# Patient Record
Sex: FEMALE | Race: WHITE | Employment: OTHER | URBAN - METROPOLITAN AREA
[De-identification: names, ages, dates, MRNs, and addresses within clinical notes are randomized per-mention and may not be internally consistent; named-entity substitution may affect disease eponyms.]

---

## 2017-01-11 PROCEDURE — 84080 ASSAY ALKALINE PHOSPHATASES: CPT | Performed by: INTERNAL MEDICINE

## 2017-01-11 PROCEDURE — 84075 ASSAY ALKALINE PHOSPHATASE: CPT | Performed by: INTERNAL MEDICINE

## 2017-02-19 PROBLEM — M25.551 RIGHT HIP PAIN: Status: ACTIVE | Noted: 2017-02-19

## 2017-02-19 PROBLEM — M06.9: Status: ACTIVE | Noted: 2017-02-19

## 2017-06-09 RX ORDER — FEXOFENADINE HCL 180 MG/1
180 TABLET ORAL DAILY
COMMUNITY

## 2017-06-09 RX ORDER — GARLIC EXTRACT 500 MG
1 CAPSULE ORAL DAILY
COMMUNITY

## 2017-06-09 RX ORDER — MULTIVITAMIN
1 TABLET ORAL DAILY
Status: ON HOLD | COMMUNITY
End: 2022-01-12

## 2017-06-19 PROBLEM — M25.551 RIGHT HIP PAIN: Status: RESOLVED | Noted: 2017-02-19 | Resolved: 2017-06-19

## 2017-06-19 PROCEDURE — 87081 CULTURE SCREEN ONLY: CPT | Performed by: FAMILY MEDICINE

## 2017-06-22 ENCOUNTER — APPOINTMENT (OUTPATIENT)
Dept: LAB | Facility: HOSPITAL | Age: 67
End: 2017-06-22
Payer: COMMERCIAL

## 2017-06-22 PROCEDURE — 86850 RBC ANTIBODY SCREEN: CPT

## 2017-06-22 PROCEDURE — 86901 BLOOD TYPING SEROLOGIC RH(D): CPT

## 2017-06-22 PROCEDURE — 86900 BLOOD TYPING SEROLOGIC ABO: CPT

## 2017-06-22 PROCEDURE — 36415 COLL VENOUS BLD VENIPUNCTURE: CPT

## 2017-06-22 NOTE — H&P
[facility]    PATIENT'S NAME: Gregreece Deep   ATTENDING PHYSICIAN: Alex Anaya M.D.    PATIENT ACCOUNT#:   [de-identified]    LOCATION:  OR   [facility]  MEDICAL RECORD #:   [id number]       YOB: 1950  ADMISSION DATE:       06/30/2017    HISTORY pantoprazole sodium, eszopiclone, hydrocodone, Xanax, prednisone, tramadol, ramipril, Breo inhaler, ProAir inhaler, oxybutynin, Flonase, Unisom, Senokot. ALLERGIES:  Dust, Humira. FAMILY HISTORY:  Two grandparents are alive, one with cancer.   Matern

## 2017-06-26 PROBLEM — M16.11 PRIMARY OSTEOARTHRITIS OF RIGHT HIP: Status: ACTIVE | Noted: 2017-06-26

## 2017-06-30 ENCOUNTER — ANESTHESIA EVENT (OUTPATIENT)
Dept: SURGERY | Facility: HOSPITAL | Age: 67
End: 2017-06-30

## 2017-06-30 ENCOUNTER — APPOINTMENT (OUTPATIENT)
Dept: GENERAL RADIOLOGY | Facility: HOSPITAL | Age: 67
DRG: 470 | End: 2017-06-30
Attending: ORTHOPAEDIC SURGERY
Payer: COMMERCIAL

## 2017-06-30 ENCOUNTER — HOSPITAL ENCOUNTER (INPATIENT)
Facility: HOSPITAL | Age: 67
LOS: 1 days | Discharge: HOME HEALTH | DRG: 470 | End: 2017-07-01
Attending: ORTHOPAEDIC SURGERY | Admitting: ORTHOPAEDIC SURGERY
Payer: COMMERCIAL

## 2017-06-30 ENCOUNTER — ANESTHESIA (OUTPATIENT)
Dept: SURGERY | Facility: HOSPITAL | Age: 67
End: 2017-06-30

## 2017-06-30 ENCOUNTER — SURGERY (OUTPATIENT)
Age: 67
End: 2017-06-30

## 2017-06-30 DIAGNOSIS — M25.551 RIGHT HIP PAIN: ICD-10-CM

## 2017-06-30 DIAGNOSIS — M06.9 RHEUMATOID ARTHRITIS INVOLVING BOTH HIPS, UNSPECIFIED RHEUMATOID FACTOR PRESENCE: Primary | ICD-10-CM

## 2017-06-30 PROCEDURE — 3E0T3CZ INTRODUCTION OF REGIONAL ANESTHETIC INTO PERIPHERAL NERVES AND PLEXI, PERCUTANEOUS APPROACH: ICD-10-PCS | Performed by: STUDENT IN AN ORGANIZED HEALTH CARE EDUCATION/TRAINING PROGRAM

## 2017-06-30 PROCEDURE — 73501 X-RAY EXAM HIP UNI 1 VIEW: CPT | Performed by: ORTHOPAEDIC SURGERY

## 2017-06-30 PROCEDURE — 97116 GAIT TRAINING THERAPY: CPT

## 2017-06-30 PROCEDURE — 88304 TISSUE EXAM BY PATHOLOGIST: CPT | Performed by: ORTHOPAEDIC SURGERY

## 2017-06-30 PROCEDURE — 97161 PT EVAL LOW COMPLEX 20 MIN: CPT

## 2017-06-30 PROCEDURE — 76942 ECHO GUIDE FOR BIOPSY: CPT | Performed by: ORTHOPAEDIC SURGERY

## 2017-06-30 PROCEDURE — 0SRB04A REPLACEMENT OF LEFT HIP JOINT WITH CERAMIC ON POLYETHYLENE SYNTHETIC SUBSTITUTE, UNCEMENTED, OPEN APPROACH: ICD-10-PCS | Performed by: ORTHOPAEDIC SURGERY

## 2017-06-30 PROCEDURE — 88311 DECALCIFY TISSUE: CPT | Performed by: ORTHOPAEDIC SURGERY

## 2017-06-30 DEVICE — TM MOD CUP 52MM CLUSTER: Type: IMPLANTABLE DEVICE | Site: HIP | Status: FUNCTIONAL

## 2017-06-30 DEVICE — MOD CUP NEU LNR LG 50/52/54X36: Type: IMPLANTABLE DEVICE | Site: HIP | Status: FUNCTIONAL

## 2017-06-30 DEVICE — BIOLOX® DELTA, CERAMIC FEMORAL HEAD, M, Ø 36/0, TAPER 12/14
Type: IMPLANTABLE DEVICE | Site: HIP | Status: FUNCTIONAL
Brand: BIOLOX® DELTA

## 2017-06-30 DEVICE — BONE SCREW 6.5X30 SELF-TAP: Type: IMPLANTABLE DEVICE | Site: HIP | Status: FUNCTIONAL

## 2017-06-30 DEVICE — BONE SCREW 6.5X40 SELF-TAP: Type: IMPLANTABLE DEVICE | Site: HIP | Status: FUNCTIONAL

## 2017-06-30 RX ORDER — FLUTICASONE PROPIONATE 50 MCG
2 SPRAY, SUSPENSION (ML) NASAL
COMMUNITY

## 2017-06-30 RX ORDER — METOCLOPRAMIDE HYDROCHLORIDE 5 MG/ML
10 INJECTION INTRAMUSCULAR; INTRAVENOUS EVERY 6 HOURS PRN
Status: DISCONTINUED | OUTPATIENT
Start: 2017-06-30 | End: 2017-07-01

## 2017-06-30 RX ORDER — NALOXONE HYDROCHLORIDE 0.4 MG/ML
80 INJECTION, SOLUTION INTRAMUSCULAR; INTRAVENOUS; SUBCUTANEOUS AS NEEDED
Status: DISCONTINUED | OUTPATIENT
Start: 2017-06-30 | End: 2017-06-30 | Stop reason: HOSPADM

## 2017-06-30 RX ORDER — HYDROMORPHONE HYDROCHLORIDE 1 MG/ML
0.2 INJECTION, SOLUTION INTRAMUSCULAR; INTRAVENOUS; SUBCUTANEOUS EVERY 2 HOUR PRN
Status: DISCONTINUED | OUTPATIENT
Start: 2017-06-30 | End: 2017-07-01

## 2017-06-30 RX ORDER — HYDROMORPHONE HYDROCHLORIDE 1 MG/ML
0.4 INJECTION, SOLUTION INTRAMUSCULAR; INTRAVENOUS; SUBCUTANEOUS EVERY 2 HOUR PRN
Status: DISCONTINUED | OUTPATIENT
Start: 2017-06-30 | End: 2017-07-01

## 2017-06-30 RX ORDER — ESZOPICLONE 3 MG/1
3 TABLET, FILM COATED ORAL NIGHTLY
COMMUNITY
End: 2017-07-09

## 2017-06-30 RX ORDER — DIPHENHYDRAMINE HYDROCHLORIDE 50 MG/ML
12.5 INJECTION INTRAMUSCULAR; INTRAVENOUS EVERY 4 HOURS PRN
Status: DISCONTINUED | OUTPATIENT
Start: 2017-06-30 | End: 2017-07-01

## 2017-06-30 RX ORDER — TRAMADOL HYDROCHLORIDE 50 MG/1
50 TABLET ORAL EVERY 6 HOURS
Status: DISCONTINUED | OUTPATIENT
Start: 2017-06-30 | End: 2017-07-01

## 2017-06-30 RX ORDER — ACETAMINOPHEN 325 MG/1
650 TABLET ORAL ONCE
Status: COMPLETED | OUTPATIENT
Start: 2017-06-30 | End: 2017-06-30

## 2017-06-30 RX ORDER — DIPHENHYDRAMINE HYDROCHLORIDE 50 MG/ML
12.5 INJECTION INTRAMUSCULAR; INTRAVENOUS AS NEEDED
Status: DISCONTINUED | OUTPATIENT
Start: 2017-06-30 | End: 2017-06-30 | Stop reason: HOSPADM

## 2017-06-30 RX ORDER — ONDANSETRON 2 MG/ML
4 INJECTION INTRAMUSCULAR; INTRAVENOUS EVERY 4 HOURS PRN
Status: DISCONTINUED | OUTPATIENT
Start: 2017-06-30 | End: 2017-07-01

## 2017-06-30 RX ORDER — POLYETHYLENE GLYCOL 3350 17 G/17G
17 POWDER, FOR SOLUTION ORAL DAILY PRN
Status: DISCONTINUED | OUTPATIENT
Start: 2017-06-30 | End: 2017-07-01

## 2017-06-30 RX ORDER — ZOLPIDEM TARTRATE 5 MG/1
5 TABLET ORAL NIGHTLY PRN
Status: DISCONTINUED | OUTPATIENT
Start: 2017-06-30 | End: 2017-07-01

## 2017-06-30 RX ORDER — DIPHENHYDRAMINE HCL 25 MG
25 CAPSULE ORAL EVERY 4 HOURS PRN
Status: DISCONTINUED | OUTPATIENT
Start: 2017-06-30 | End: 2017-07-01

## 2017-06-30 RX ORDER — RAMIPRIL 10 MG/1
20 CAPSULE ORAL EVERY EVENING
COMMUNITY
End: 2018-02-13

## 2017-06-30 RX ORDER — MEPERIDINE HYDROCHLORIDE 25 MG/ML
12.5 INJECTION INTRAMUSCULAR; INTRAVENOUS; SUBCUTANEOUS AS NEEDED
Status: DISCONTINUED | OUTPATIENT
Start: 2017-06-30 | End: 2017-06-30 | Stop reason: HOSPADM

## 2017-06-30 RX ORDER — HYDROMORPHONE HYDROCHLORIDE 1 MG/ML
0.8 INJECTION, SOLUTION INTRAMUSCULAR; INTRAVENOUS; SUBCUTANEOUS EVERY 2 HOUR PRN
Status: DISCONTINUED | OUTPATIENT
Start: 2017-06-30 | End: 2017-07-01

## 2017-06-30 RX ORDER — PANTOPRAZOLE SODIUM 40 MG/1
40 TABLET, DELAYED RELEASE ORAL
COMMUNITY
End: 2018-05-23

## 2017-06-30 RX ORDER — ALPRAZOLAM 0.25 MG/1
0.25 TABLET ORAL EVERY 6 HOURS PRN
COMMUNITY
End: 2018-05-23

## 2017-06-30 RX ORDER — SENNOSIDES 8.6 MG
17.2 TABLET ORAL NIGHTLY
Status: DISCONTINUED | OUTPATIENT
Start: 2017-06-30 | End: 2017-07-01

## 2017-06-30 RX ORDER — ALPRAZOLAM 0.25 MG/1
0.25 TABLET ORAL EVERY 6 HOURS PRN
Status: DISCONTINUED | OUTPATIENT
Start: 2017-06-30 | End: 2017-07-01

## 2017-06-30 RX ORDER — ACETAMINOPHEN 325 MG/1
650 TABLET ORAL 4 TIMES DAILY
Status: DISCONTINUED | OUTPATIENT
Start: 2017-06-30 | End: 2017-07-01

## 2017-06-30 RX ORDER — HYDROCODONE BITARTRATE AND ACETAMINOPHEN 10; 325 MG/1; MG/1
1-2 TABLET ORAL EVERY 4 HOURS PRN
Qty: 80 TABLET | Refills: 0 | Status: SHIPPED | OUTPATIENT
Start: 2017-06-30 | End: 2017-11-08 | Stop reason: ALTCHOICE

## 2017-06-30 RX ORDER — ETANERCEPT 50 MG/ML
SOLUTION SUBCUTANEOUS
Refills: 1 | COMMUNITY
Start: 2017-06-19 | End: 2017-08-08

## 2017-06-30 RX ORDER — OXYCODONE HYDROCHLORIDE 5 MG/1
5 TABLET ORAL EVERY 4 HOURS PRN
Status: DISCONTINUED | OUTPATIENT
Start: 2017-06-30 | End: 2017-07-01

## 2017-06-30 RX ORDER — TRAMADOL HYDROCHLORIDE 50 MG/1
50 TABLET ORAL
Status: ON HOLD | COMMUNITY
End: 2018-09-14

## 2017-06-30 RX ORDER — CETIRIZINE HYDROCHLORIDE 10 MG/1
10 TABLET ORAL DAILY PRN
Status: DISCONTINUED | OUTPATIENT
Start: 2017-06-30 | End: 2017-07-01

## 2017-06-30 RX ORDER — ONDANSETRON 2 MG/ML
4 INJECTION INTRAMUSCULAR; INTRAVENOUS AS NEEDED
Status: DISCONTINUED | OUTPATIENT
Start: 2017-06-30 | End: 2017-06-30 | Stop reason: HOSPADM

## 2017-06-30 RX ORDER — HYDROMORPHONE HYDROCHLORIDE 1 MG/ML
0.4 INJECTION, SOLUTION INTRAMUSCULAR; INTRAVENOUS; SUBCUTANEOUS EVERY 5 MIN PRN
Status: DISCONTINUED | OUTPATIENT
Start: 2017-06-30 | End: 2017-06-30 | Stop reason: HOSPADM

## 2017-06-30 RX ORDER — ACETAMINOPHEN, ASPIRIN AND CAFFEINE 250; 250; 65 MG/1; MG/1; MG/1
1 TABLET, FILM COATED ORAL EVERY 6 HOURS PRN
Status: ON HOLD | COMMUNITY
End: 2017-07-01

## 2017-06-30 RX ORDER — SODIUM CHLORIDE, SODIUM LACTATE, POTASSIUM CHLORIDE, CALCIUM CHLORIDE 600; 310; 30; 20 MG/100ML; MG/100ML; MG/100ML; MG/100ML
INJECTION, SOLUTION INTRAVENOUS CONTINUOUS
Status: DISCONTINUED | OUTPATIENT
Start: 2017-06-30 | End: 2017-07-01

## 2017-06-30 RX ORDER — CYCLOBENZAPRINE HCL 5 MG
5 TABLET ORAL 3 TIMES DAILY PRN
Status: DISCONTINUED | OUTPATIENT
Start: 2017-06-30 | End: 2017-07-01

## 2017-06-30 RX ORDER — ALBUTEROL SULFATE 90 UG/1
2 AEROSOL, METERED RESPIRATORY (INHALATION) EVERY 4 HOURS PRN
Status: DISCONTINUED | OUTPATIENT
Start: 2017-06-30 | End: 2017-07-01

## 2017-06-30 RX ORDER — BISACODYL 10 MG
10 SUPPOSITORY, RECTAL RECTAL
Status: DISCONTINUED | OUTPATIENT
Start: 2017-06-30 | End: 2017-07-01

## 2017-06-30 RX ORDER — FLUTICASONE PROPIONATE 50 MCG
2 SPRAY, SUSPENSION (ML) NASAL
Status: DISCONTINUED | OUTPATIENT
Start: 2017-06-30 | End: 2017-07-01

## 2017-06-30 RX ORDER — ACETAMINOPHEN 325 MG/1
TABLET ORAL
Status: COMPLETED
Start: 2017-06-30 | End: 2017-06-30

## 2017-06-30 RX ORDER — CEFAZOLIN SODIUM 1 G/3ML
INJECTION, POWDER, FOR SOLUTION INTRAMUSCULAR; INTRAVENOUS
Status: DISCONTINUED | OUTPATIENT
Start: 2017-06-30 | End: 2017-06-30 | Stop reason: HOSPADM

## 2017-06-30 RX ORDER — SODIUM PHOSPHATE, DIBASIC AND SODIUM PHOSPHATE, MONOBASIC 7; 19 G/133ML; G/133ML
1 ENEMA RECTAL ONCE AS NEEDED
Status: DISCONTINUED | OUTPATIENT
Start: 2017-06-30 | End: 2017-07-01

## 2017-06-30 RX ORDER — OXYCODONE HCL 10 MG/1
10 TABLET, FILM COATED, EXTENDED RELEASE ORAL
Status: DISCONTINUED | OUTPATIENT
Start: 2017-06-30 | End: 2017-07-01

## 2017-06-30 RX ORDER — OXYCODONE HCL 10 MG/1
10 TABLET, FILM COATED, EXTENDED RELEASE ORAL
Status: COMPLETED | OUTPATIENT
Start: 2017-06-30 | End: 2017-06-30

## 2017-06-30 RX ORDER — PANTOPRAZOLE SODIUM 40 MG/1
40 TABLET, DELAYED RELEASE ORAL
Status: DISCONTINUED | OUTPATIENT
Start: 2017-06-30 | End: 2017-07-01

## 2017-06-30 RX ORDER — OXYCODONE HYDROCHLORIDE 15 MG/1
15 TABLET ORAL EVERY 4 HOURS PRN
Status: DISCONTINUED | OUTPATIENT
Start: 2017-06-30 | End: 2017-07-01

## 2017-06-30 RX ORDER — DOCUSATE SODIUM 100 MG/1
100 CAPSULE, LIQUID FILLED ORAL 2 TIMES DAILY
Status: DISCONTINUED | OUTPATIENT
Start: 2017-06-30 | End: 2017-07-01

## 2017-06-30 RX ORDER — DIPHENHYDRAMINE HYDROCHLORIDE 50 MG/ML
25 INJECTION INTRAMUSCULAR; INTRAVENOUS ONCE AS NEEDED
Status: ACTIVE | OUTPATIENT
Start: 2017-06-30 | End: 2017-06-30

## 2017-06-30 RX ORDER — OXYBUTYNIN CHLORIDE 10 MG/1
10 TABLET, EXTENDED RELEASE ORAL DAILY
COMMUNITY
End: 2017-11-08

## 2017-06-30 RX ORDER — OXYCODONE HYDROCHLORIDE 10 MG/1
10 TABLET ORAL EVERY 4 HOURS PRN
Status: DISCONTINUED | OUTPATIENT
Start: 2017-06-30 | End: 2017-07-01

## 2017-06-30 RX ORDER — MIDAZOLAM HYDROCHLORIDE 1 MG/ML
1 INJECTION INTRAMUSCULAR; INTRAVENOUS EVERY 5 MIN PRN
Status: DISCONTINUED | OUTPATIENT
Start: 2017-06-30 | End: 2017-06-30 | Stop reason: HOSPADM

## 2017-06-30 NOTE — HOME CARE LIAISON
Received referral for [facility] on d/c for SN/PT. Met with patient who is agreeable to [facility] on d/c. Agency brochure given to patient. Referral sent to [facility] via [facility]    Thank you for this referral,   Cuong Muller

## 2017-06-30 NOTE — HOME CARE LIAISON
DIAGNOSES AND PERTINENT MEDICAL HISTORY:  RIGHT THR    FACILITY NAME AND DC DATE:  [facility]   PENDING    BEDSIDE VISIT WITH: TNL MET WITHPT AT BEDSIDE    SERVICES ORDERED:  SN/PT    VERIFIED PATIENT ADDRESS, PHONE NUMBER AND CAREGIVER   Y    [phone]

## 2017-06-30 NOTE — BRIEF OP NOTE
Pre-Operative Diagnosis: OSTEOARTHRITIS RIGHT HIP     Post-Operative Diagnosis: OSTEOARTHRITIS RIGHT HIP     Procedure Performed:   Procedure(s):  RIGHT TOTAL HIP ARTHROPLASTY    Surgeon(s) and Role:     Shorty Porter MD - Primary    Assistant(s):

## 2017-06-30 NOTE — ANESTHESIA POSTPROCEDURE EVALUATION
[address] [facility] Patient Status:  Surgery Admit   Age/Gender 79year old female MRN [id number]   [facility] SURGERY Attending Mehran Arriaza MD   Hosp Day # 0 PCP Alhaji Paez DO       Anesthesia Post-op Note    Procedure(s):

## 2017-06-30 NOTE — PHYSICAL THERAPY NOTE
PHYSICAL THERAPY HIP EVALUATION - INPATIENT     Room Number: 353/353-A  Evaluation Date: 6/30/2017  Type of Evaluation: Initial  Physician Order: PT Eval and Treat    Presenting Problem: s/p R DARRICK  Reason for Therapy: Mobility Dysfunction and Discharge [facility]NAG[facility]  4/1/2015: INJECTION, ANESTHETIC/STEROID, TRANSFORAMINAL * N/A      Comment: Procedure: TRANSFORAMINAL EPIDURAL - LUMBAR;                 Surgeon: Elizabeth Sharif MD;  Location: [address] [place] [address] MANAGEMENT  4/16/2015: Mariaa Huerta strength are within functional limits     Lower extremity ROM is within functional limits     Lower extremity strength is within functional limits     BALANCE                ADDITIONAL TESTS                                 ACTIVITY TOLERANCE  O2 Saturation in chair;Needs met;Call light within reach;RN aware of session/findings; All patient questions and concerns addressed;SCDs in place    ASSESSMENT          Patient is a 79year old female admitted on 6/30/2017 for R DARRICK.   In this PT evaluation, the patient p

## 2017-06-30 NOTE — CONSULTS
General Medicine Consult      Reason for consult: post-op medical management     Consulted by: Dr. Sangeeta Galarza    PCP: Jazmín Shelton DO      History of Present Illness: Patient is a 79year old female with PMH sig for RA, HENRI, HTN presented for elective R DARRICK. Jarrell Roque MD;  Location: [address] [place] [address] MANAGEMENT  4/1/2015: INJECTION, ANESTHETIC/STEROID, TRANSFORAMINAL * N/A      Comment: Procedure: TRANSFORAMINAL EPIDURAL - LUMBAR;                 Surgeon: Patel Mckeon MD;  Location: [facility] Oral Tab Take 50 mg by mouth. Every 6-8 hours as needed.   Disp:  Rfl:    ENBREL SURECLICK 50 MG/ML Subcutaneous Solution Auto-injector  Disp:  Rfl: 1   HYDROcodone-acetaminophen (NORCO)  MG Oral Tab Take 1-2 tablets by mouth every 4 (four) hours as n diphenhydrAMINE **OR** DiphenhydrAMINE HCl, Zolpidem Tartrate, Cyclobenzaprine HCl, oxyCODONE HCl **OR** oxyCODONE HCl **OR** oxyCODONE HCl, HYDROmorphone HCl PF **OR** HYDROmorphone HCl PF **OR** HYDROmorphone HCl PF, Albuterol Sulfate HFA, ALPRAZolam, ce place/c/d/i      Diagnostics:   CBC/Chem  Component      Latest Ref Rng & Units 6/19/2017   WBC      4.00 - 13.00 10:3/uL 8.43   RBC      3.80 - 5.10 10:6/uL 4.27   Hemoglobin      12.0 - 16.0 g/dL 12.5   Hematocrit      34.0 - 50.0 % 40.4   MCV      81.0 (lmd=99312)    Result Date: 6/27/2017  PELVIS AND FEMUR STANDING AP OF THE PELVIS: Patient does have osteoarthritis in both hips, right greater than left, with joint space narrowing and large osteophytes are present involving the lateral aspect of the righ # HTN  - SBP 140s  - cont OP BP meds    # Anxiety  - cont OP regimen      Check Hgb in AM to monitor for acute blood loss anemia. Outpatient records or previous hospital records reviewed. Appreciate this consultation.  [facility] hospitalist to continue

## 2017-06-30 NOTE — ANESTHESIA PREPROCEDURE EVALUATION
PRE-OP EVALUATION    Patient Name: Leonila More    Pre-op Diagnosis: OSTEOARTHRITIS RIGHT HIP    Procedure(s):  RIGHT TOTAL HIP ARTHROPLASTY    Surgeon(s) and Role:     Karlene Jerez MD - Primary    Pre-op vitals reviewed.   Temp: 97.4 °F (36.3 °C)  Pul Disp: 1 Inhaler Rfl: 11   OXYBUTYNIN CHLORIDE ER 10 MG Oral Tablet 24 Hr TAKE 1 TABLET BY MOUTH EVERY DAY Disp: 90 tablet Rfl: 3   Fluticasone Propionate (FLONASE) 50 MCG/ACT Nasal Suspension 2 sprays in each nostril daily as needed Disp: 1 Bottle Rfl: 0 [facility]  4/1/2015: INJECTION, ANESTHETIC/STEROID, TRANSFORAMINAL * N/A      Comment: Procedure: TRANSFORAMINAL EPIDURAL - LUMBAR;                 Surgeon: Harmony Armstrong MD;  Location: [address] [place] [address] opening: >3 FB  TM distance: > 6 cm  Neck ROM: full Cardiovascular    Cardiovascular exam normal.  Rhythm: regular  Rate: normal     Dental    No notable dental history.          Pulmonary    Pulmonary exam normal.  Breath sounds clear to auscultation bilat

## 2017-06-30 NOTE — CM/SW NOTE
06/30/17 1500   CM/SW Referral Data   Referral Source Physician   Reason for Referral Discharge planning   Informant Patient;Spouse   Pertinent Medical Hx   Primary Care Physician Name J.  [facility]   Patient Info   Patient's Mental Status Alert;Oriented

## 2017-06-30 NOTE — INTERVAL H&P NOTE
Pre-op Diagnosis: OSTEOARTHRITIS RIGHT HIP    The above referenced H&P was reviewed by Tasneem Bassett MD on 6/30/2017, the patient was examined and no significant changes have occurred in the patient's condition since the H&P was performed.   I discussed wi

## 2017-07-01 VITALS
SYSTOLIC BLOOD PRESSURE: 113 MMHG | DIASTOLIC BLOOD PRESSURE: 65 MMHG | HEART RATE: 59 BPM | TEMPERATURE: 98 F | HEIGHT: 69 IN | WEIGHT: 200.75 LBS | OXYGEN SATURATION: 97 % | BODY MASS INDEX: 29.73 KG/M2 | RESPIRATION RATE: 22 BRPM

## 2017-07-01 LAB
ERYTHROCYTE [DISTWIDTH] IN BLOOD BY AUTOMATED COUNT: 17.9 % (ref 11.5–16)
HCT VFR BLD AUTO: 31 % (ref 34–50)
HGB BLD-MCNC: 10 G/DL (ref 12–16)
MCH RBC QN AUTO: 29.5 PG (ref 27–33.2)
MCHC RBC AUTO-ENTMCNC: 32.3 G/DL (ref 31–37)
MCV RBC AUTO: 91.4 FL (ref 81–100)
PLATELET # BLD AUTO: 253 10(3)UL (ref 150–450)
RBC # BLD AUTO: 3.39 X10(6)UL (ref 3.8–5.1)
RED CELL DISTRIBUTION WIDTH-SD: 60.1 FL (ref 35.1–46.3)
WBC # BLD AUTO: 10.4 X10(3) UL (ref 4–13)

## 2017-07-01 PROCEDURE — 97116 GAIT TRAINING THERAPY: CPT

## 2017-07-01 PROCEDURE — 97166 OT EVAL MOD COMPLEX 45 MIN: CPT

## 2017-07-01 PROCEDURE — 97150 GROUP THERAPEUTIC PROCEDURES: CPT

## 2017-07-01 PROCEDURE — 85027 COMPLETE CBC AUTOMATED: CPT | Performed by: ORTHOPAEDIC SURGERY

## 2017-07-01 PROCEDURE — 97535 SELF CARE MNGMENT TRAINING: CPT

## 2017-07-01 RX ORDER — RAMIPRIL 5 MG/1
20 CAPSULE ORAL EVERY EVENING
Status: DISCONTINUED | OUTPATIENT
Start: 2017-07-01 | End: 2017-07-01

## 2017-07-01 RX ORDER — HYDROCODONE BITARTRATE AND ACETAMINOPHEN 10; 325 MG/1; MG/1
2 TABLET ORAL EVERY 4 HOURS PRN
Status: DISCONTINUED | OUTPATIENT
Start: 2017-07-01 | End: 2017-07-01

## 2017-07-01 RX ORDER — HYDROCODONE BITARTRATE AND ACETAMINOPHEN 10; 325 MG/1; MG/1
1 TABLET ORAL EVERY 4 HOURS PRN
Status: DISCONTINUED | OUTPATIENT
Start: 2017-07-01 | End: 2017-07-01

## 2017-07-01 RX ORDER — MELATONIN
325
Qty: 30 TABLET | Refills: 0 | Status: SHIPPED | OUTPATIENT
Start: 2017-07-01 | End: 2018-08-16

## 2017-07-01 NOTE — PHYSICAL THERAPY NOTE
PHYSICAL THERAPY HIP TREATMENT NOTE - INPATIENT      Room Number: 353/353-A     Session: 2  Number of Visits to Meet Established Goals: 4    Presenting Problem: s/p R DARRICK    Problem List  Active Problems:    * No active hospital problems.  *      Past Medic Surgeon: Brenda Bradshaw MD;  Location: [address] [place] [address] MANAGEMENT  4/16/2015: INJECTION, ANESTHETIC/STEROID, TRANSFORAMINAL * Left      Comment: Procedure: TRANSFORAMINAL EPIDURAL - LUMBAR;                 Surgeon: Jess Batres, Little   -   Need to walk in hospital room?: A Little   -   Climbing 3-5 steps with a railing?: A Little       AM-PAC Score:  Raw Score: 18   PT Approx Degree of Impairment Score: 46.58%   Standardized Score (AM-PAC Scale): 43.63   CMS Modifier (G-Code): C education;Patient education;Gait training;Range of motion;Strengthening;Stair training;Transfer training  Rehab Potential : Good  Frequency (Obs): BID    CURRENT GOALS  Goal #1  Patient is able to demonstrate supine - sit EOB @ level: supervision-NT   Goal

## 2017-07-01 NOTE — PROGRESS NOTES
She feels great. She walked multiple times yesterday. Working with OT currently. She wants to go home today.     Patient Vitals for the past 24 hrs:   BP Temp Temp src Pulse Resp SpO2   07/01/17 0738 129/75 97.6 °F (36.4 °C) Oral 59 18 98 %   07/01/17 040

## 2017-07-01 NOTE — PROGRESS NOTES
[facility] Hospitalist Progress Note                                                                   [address]  1/6/1950    CC: FU post op    Interval History:  - Feels well today, hoping to g MCV  91.4   MCH  29.5   MCHC  32.3   RDW  17.9*   WBC  10.4   PLT  253.0     No results for input(s): GLU, BUN, CREATSERUM, GFRAA, GFRNAA, CA, NA, K, CL, CO2 in the last 72 hours.         ROS: no change to ROS from my documentation yesterday, except as ot

## 2017-07-01 NOTE — CM/SW NOTE
Informed by [facility] liaison late yesterday that she met with pt re: no copay for [facility] visits. [facility] Amilcar Kennyleeanna [phone] accepted case and will start care at home the day after d/c.

## 2017-07-01 NOTE — OCCUPATIONAL THERAPY NOTE
OCCUPATIONAL THERAPY QUICK EVALUATION - INPATIENT    Room Number: 353/353-A  Evaluation Date: 7/1/2017     Type of Evaluation: Initial  Presenting Problem: R DARRICK 6/30    Physician Order: IP Consult to Occupational Therapy  Reason for Therapy:  ADL/IADL Dys EPIDURAL - LUMBAR;                 Surgeon: Nadya Herrera MD;  Location: [address] [place] [address] MANAGEMENT  4/1/2015: INJECTION, ANESTHETIC/STEROID, TRANSFORAMINAL * N/A      Comment: Procedure: TRANSFORAMINAL EPIDURAL - LUMBAR; Activity promotion    COGNITION  WFL    RANGE OF MOTION AND STRENGTH ASSESSMENT  Upper extremity ROM is within functional limits     Upper extremity strength is within functional limits     NEUROLOGICAL FINDINGS  Neurological Findings: None environment. Patient End of Session: Up in chair;Family present;SCDs in place; All patient questions and concerns addressed;RN aware of session/findings;Call light within reach; Needs met    ASSESSMENT   Pt seen for OT eval and treatment.  Pt has met all

## 2017-07-01 NOTE — PROGRESS NOTES
Post Op Day 1 Ortho Note    Status Post Nerve Block:  Type of Nerve Block: Right Fascia Iliaca DARRICK  Single Injection Nerve Block    Post op review: No evidence of immediate block related complications, No paresthesia noted, Able to lift leg(s), Able to geraldine

## 2017-07-01 NOTE — PROGRESS NOTES
NURSING DISCHARGE NOTE    Discharged via wheelchair  Accompanied by   Belongings all sent with the patient

## 2017-07-01 NOTE — OPERATIVE REPORT
[facility]    PATIENT'S NAME: [facility]   ATTENDING PHYSICIAN: Sunshine Fitzgerald M.D. OPERATING PHYSICIAN: Sunshine Fitzgerald M.D.    PATIENT ACCOUNT#:   [de-identified]    LOCATION:  [address] [place] [address]  MEDICAL RECORD #:   [id number]       DATE OF BIRTH:  [birth year] teased apart with a gloved digit. Self-retaining Charnley retractor was positioned with the larger blades. The hip was placed into marked internal rotation. Care was taken to protect the sciatic nerve.   The short external rotators were released from the broaching with size 4 broach and broached all way up to a size 11 broach when excellent rotational stability was achieved. Calcar planer was used to plane the calcar. The broach was removed. Attention was turned towards the acetabulum.   An anterior acet Limb length was restored. Offset was stable. I withdrew the trials. I copiously irrigated the hip. I chose my final components.   I chose a Geri ML taper press-fit cementless size 11 standard offset reduced neck length femoral component impacted into

## 2017-07-01 NOTE — PROGRESS NOTES
Discharge orders received. Both patient and  attended discharge education class. All questions answered. Home with [facility]. Has a walker. Rx for Norco and xarelto given.  Safetyprec in place

## 2017-07-03 NOTE — CM/SW NOTE
07/03/17 0800   Discharge disposition   Discharged to: Home-Health   Name of Facillity/Home Care/Hospice Residential   Discharge transportation Private car   DC 7/1/17

## 2017-07-05 PROBLEM — Z47.89 ORTHOPEDIC AFTERCARE: Status: ACTIVE | Noted: 2017-07-05

## 2017-07-25 ENCOUNTER — APPOINTMENT (OUTPATIENT)
Dept: GENERAL RADIOLOGY | Facility: HOSPITAL | Age: 67
End: 2017-07-25
Attending: EMERGENCY MEDICINE
Payer: COMMERCIAL

## 2017-07-25 ENCOUNTER — HOSPITAL ENCOUNTER (EMERGENCY)
Facility: HOSPITAL | Age: 67
Discharge: HOME | End: 2017-07-25
Attending: EMERGENCY MEDICINE
Payer: COMMERCIAL

## 2017-07-25 VITALS
TEMPERATURE: 98 F | WEIGHT: 200 LBS | RESPIRATION RATE: 18 BRPM | HEIGHT: 69 IN | DIASTOLIC BLOOD PRESSURE: 59 MMHG | SYSTOLIC BLOOD PRESSURE: 112 MMHG | BODY MASS INDEX: 29.62 KG/M2 | HEART RATE: 70 BPM | OXYGEN SATURATION: 98 %

## 2017-07-25 DIAGNOSIS — S73.004A HIP DISLOCATION, RIGHT, INITIAL ENCOUNTER (HCC): Primary | ICD-10-CM

## 2017-07-25 PROCEDURE — 99285 EMERGENCY DEPT VISIT HI MDM: CPT

## 2017-07-25 PROCEDURE — 94770 HC CARBON DIOXIDE EXPIRED GAS DETERMINATION: CPT

## 2017-07-25 PROCEDURE — 27265 TREAT HIP DISLOCATION: CPT

## 2017-07-25 PROCEDURE — 73502 X-RAY EXAM HIP UNI 2-3 VIEWS: CPT | Performed by: EMERGENCY MEDICINE

## 2017-07-25 PROCEDURE — 73501 X-RAY EXAM HIP UNI 1 VIEW: CPT | Performed by: EMERGENCY MEDICINE

## 2017-07-25 PROCEDURE — 96375 TX/PRO/DX INJ NEW DRUG ADDON: CPT

## 2017-07-25 PROCEDURE — 99284 EMERGENCY DEPT VISIT MOD MDM: CPT

## 2017-07-25 PROCEDURE — 96374 THER/PROPH/DIAG INJ IV PUSH: CPT

## 2017-07-25 RX ORDER — ONDANSETRON 2 MG/ML
4 INJECTION INTRAMUSCULAR; INTRAVENOUS ONCE
Status: COMPLETED | OUTPATIENT
Start: 2017-07-25 | End: 2017-07-25

## 2017-07-25 RX ORDER — HYDROMORPHONE HYDROCHLORIDE 1 MG/ML
1 INJECTION, SOLUTION INTRAMUSCULAR; INTRAVENOUS; SUBCUTANEOUS EVERY 30 MIN PRN
Status: DISCONTINUED | OUTPATIENT
Start: 2017-07-25 | End: 2017-07-25

## 2017-07-25 NOTE — ED NOTES
Patient has numbness to that foot already from previous will continue to assess her when she returns

## 2017-07-25 NOTE — ED INITIAL ASSESSMENT (HPI)
Recent hip surgery 4 weeks ago.  Today she missed a step and heard her hip pop obvious dislocation she took 20 of norco before she came in

## 2017-07-25 NOTE — ED PROVIDER NOTES
Patient Seen in: [facility] Emergency Department    History   Patient presents with:  Lower Extremity Injury (musculoskeletal)    Stated Complaint: hip dislocation    HPI    30-year-old white female who presents emerged from today for plan of right TRANSFORAMINAL EPIDURAL - LUMBAR;                 Surgeon: Shabnam Galarza MD;  Location: [address] [place] [address] MANAGEMENT  4/1/2015: INJECTION, ANESTHETIC/STEROID, TRANSFORAMINAL * N/A      Comment: Procedure: TRANSFORAMINAL EPIDURAL - LUMBAR tablet by mouth nightly as needed. TraMADol HCl 50 MG Oral Tab,  Take 50 mg by mouth. Every 6-8 hours as needed. SALINE MIST SPRAY NA,  1 spray by Nasal route as needed.      ENBREL SURECLICK 50 MG/ML Subcutaneous Solution Auto-injector,     HYDROcod otherwise stated in HPI.     Physical Exam   ED Triage Vitals [07/25/17 1042]  BP: 140/51  Pulse: 78  Resp: 18  Temp: 98 °F (36.7 °C)  Temp src: Temporal  SpO2: 99 %  O2 Device: None (Room air)    Current:/85   Pulse 80   Temp 98 °F (36.7 °C) (Tempora examination are stable.    ============================================================  ED Course  ------------------------------------------------------------  MDM   Patient had an IV established in the emergency room was placed on cardiac monitor was gi

## 2017-09-11 PROBLEM — T84.029D DISLOCATION OF HIP JOINT PROSTHESIS, SUBSEQUENT ENCOUNTER: Status: ACTIVE | Noted: 2017-09-11

## 2017-09-11 PROBLEM — Z96.649 DISLOCATION OF HIP JOINT PROSTHESIS, SUBSEQUENT ENCOUNTER: Status: ACTIVE | Noted: 2017-09-11

## 2018-03-13 PROBLEM — H43.812 PVD (POSTERIOR VITREOUS DETACHMENT), LEFT: Status: ACTIVE | Noted: 2018-03-13

## 2018-03-13 PROBLEM — H25.813 COMBINED FORMS OF AGE-RELATED CATARACT OF BOTH EYES: Status: ACTIVE | Noted: 2018-03-13

## 2018-05-23 PROCEDURE — 87086 URINE CULTURE/COLONY COUNT: CPT | Performed by: FAMILY MEDICINE

## 2018-05-23 PROCEDURE — 81001 URINALYSIS AUTO W/SCOPE: CPT | Performed by: FAMILY MEDICINE

## 2018-06-26 PROBLEM — Z96.641 STATUS POST RIGHT HIP REPLACEMENT: Status: ACTIVE | Noted: 2018-06-26

## 2018-08-16 RX ORDER — THIAMINE HCL 100 MG
500 TABLET ORAL DAILY
COMMUNITY
End: 2020-11-05 | Stop reason: ALTCHOICE

## 2018-08-16 RX ORDER — OMEGA-3 FATTY ACIDS CAP DELAYED RELEASE 1000 MG 1000 MG
2 CAPSULE DELAYED RELEASE ORAL DAILY
COMMUNITY
End: 2021-03-25 | Stop reason: ALTCHOICE

## 2018-08-20 ENCOUNTER — LABORATORY ENCOUNTER (OUTPATIENT)
Dept: LAB | Facility: HOSPITAL | Age: 68
End: 2018-08-20
Payer: COMMERCIAL

## 2018-08-20 ENCOUNTER — HOSPITAL ENCOUNTER (OUTPATIENT)
Dept: PHYSICAL THERAPY | Facility: HOSPITAL | Age: 68
Discharge: HOME | End: 2018-08-20
Attending: ORTHOPAEDIC SURGERY
Payer: COMMERCIAL

## 2018-08-20 DIAGNOSIS — M16.12 PRIMARY OSTEOARTHRITIS OF LEFT HIP: ICD-10-CM

## 2018-08-20 LAB
ANTIBODY SCREEN: NEGATIVE
RH BLOOD TYPE: POSITIVE

## 2018-08-20 PROCEDURE — 86850 RBC ANTIBODY SCREEN: CPT

## 2018-08-20 PROCEDURE — 86901 BLOOD TYPING SEROLOGIC RH(D): CPT

## 2018-08-20 PROCEDURE — 86900 BLOOD TYPING SEROLOGIC ABO: CPT

## 2018-08-27 NOTE — H&P
[address]    PATIENT'S NAME: [facility]   ATTENDING PHYSICIAN: Sunshine Fitzgerald M.D.    PATIENT ACCOUNT#:   [de-identified]    LOCATION:    MEDICAL RECORD #:   [id number]       YOB: 1950  ADMISSION DATE:       09/14/2018    HISTORY AND PHYS incision, risk of anesthesia, wound infection, DVT, limb length discrepancy, dislocation, and ongoing pain and discomfort. Despite these and other risks, the patient does wish to proceed. She has no medical contraindication to the proposed surgery.     PA temperature 97.9, pulse 78, respiratory rate 16, blood pressure 132/86. HEENT:  Unremarkable. LUNGS:  Clear. HEART:  Normal S1, S2, without murmur. ABDOMEN:  Benign.   EXTREMITIES:  Significant for pain on range of motion of the left hip with limitatio

## 2018-09-11 PROCEDURE — 87081 CULTURE SCREEN ONLY: CPT | Performed by: FAMILY MEDICINE

## 2018-09-14 ENCOUNTER — ANESTHESIA (OUTPATIENT)
Dept: SURGERY | Facility: HOSPITAL | Age: 68
DRG: 470 | End: 2018-09-14
Payer: COMMERCIAL

## 2018-09-14 ENCOUNTER — ANESTHESIA EVENT (OUTPATIENT)
Dept: SURGERY | Facility: HOSPITAL | Age: 68
DRG: 470 | End: 2018-09-14
Payer: COMMERCIAL

## 2018-09-14 ENCOUNTER — HOSPITAL ENCOUNTER (INPATIENT)
Facility: HOSPITAL | Age: 68
LOS: 2 days | Discharge: HOME HEALTH | DRG: 470 | End: 2018-09-16
Attending: ORTHOPAEDIC SURGERY | Admitting: ORTHOPAEDIC SURGERY
Payer: COMMERCIAL

## 2018-09-14 ENCOUNTER — APPOINTMENT (OUTPATIENT)
Dept: GENERAL RADIOLOGY | Facility: HOSPITAL | Age: 68
DRG: 470 | End: 2018-09-14
Attending: ORTHOPAEDIC SURGERY
Payer: COMMERCIAL

## 2018-09-14 ENCOUNTER — APPOINTMENT (OUTPATIENT)
Dept: GENERAL RADIOLOGY | Facility: HOSPITAL | Age: 68
DRG: 470 | End: 2018-09-14
Attending: HOSPITALIST
Payer: COMMERCIAL

## 2018-09-14 DIAGNOSIS — M16.12 PRIMARY OSTEOARTHRITIS OF LEFT HIP: Primary | ICD-10-CM

## 2018-09-14 PROCEDURE — 3E0T3BZ INTRODUCTION OF ANESTHETIC AGENT INTO PERIPHERAL NERVES AND PLEXI, PERCUTANEOUS APPROACH: ICD-10-PCS | Performed by: ANESTHESIOLOGY

## 2018-09-14 PROCEDURE — 76942 ECHO GUIDE FOR BIOPSY: CPT | Performed by: ORTHOPAEDIC SURGERY

## 2018-09-14 PROCEDURE — 71046 X-RAY EXAM CHEST 2 VIEWS: CPT | Performed by: HOSPITALIST

## 2018-09-14 PROCEDURE — 0SRB0JA REPLACEMENT OF LEFT HIP JOINT WITH SYNTHETIC SUBSTITUTE, UNCEMENTED, OPEN APPROACH: ICD-10-PCS | Performed by: ORTHOPAEDIC SURGERY

## 2018-09-14 PROCEDURE — 94640 AIRWAY INHALATION TREATMENT: CPT

## 2018-09-14 PROCEDURE — 73501 X-RAY EXAM HIP UNI 1 VIEW: CPT | Performed by: ORTHOPAEDIC SURGERY

## 2018-09-14 PROCEDURE — 88311 DECALCIFY TISSUE: CPT | Performed by: ORTHOPAEDIC SURGERY

## 2018-09-14 PROCEDURE — 88304 TISSUE EXAM BY PATHOLOGIST: CPT | Performed by: ORTHOPAEDIC SURGERY

## 2018-09-14 DEVICE — MOD CUP NEU LNR LG 50/52/54X36: Type: IMPLANTABLE DEVICE | Site: HIP | Status: FUNCTIONAL

## 2018-09-14 DEVICE — BONE SCREW 6.5X30 SELF-TAP: Type: IMPLANTABLE DEVICE | Site: HIP | Status: FUNCTIONAL

## 2018-09-14 DEVICE — BIOLOX® DELTA, CERAMIC FEMORAL HEAD, M, Ø 36/0, TAPER 12/14
Type: IMPLANTABLE DEVICE | Site: HIP | Status: FUNCTIONAL
Brand: BIOLOX® DELTA

## 2018-09-14 DEVICE — TM MOD CUP 52MM CLUSTER: Type: IMPLANTABLE DEVICE | Site: HIP | Status: FUNCTIONAL

## 2018-09-14 DEVICE — BONE SCREW 6.5X35 SELF-TAP: Type: IMPLANTABLE DEVICE | Site: HIP | Status: FUNCTIONAL

## 2018-09-14 RX ORDER — SODIUM PHOSPHATE, DIBASIC AND SODIUM PHOSPHATE, MONOBASIC 7; 19 G/133ML; G/133ML
1 ENEMA RECTAL ONCE AS NEEDED
Status: DISCONTINUED | OUTPATIENT
Start: 2018-09-14 | End: 2018-09-16

## 2018-09-14 RX ORDER — DIPHENHYDRAMINE HCL 25 MG
25 CAPSULE ORAL EVERY 4 HOURS PRN
Status: DISCONTINUED | OUTPATIENT
Start: 2018-09-14 | End: 2018-09-16

## 2018-09-14 RX ORDER — DIPHENHYDRAMINE HYDROCHLORIDE 50 MG/ML
25 INJECTION INTRAMUSCULAR; INTRAVENOUS ONCE AS NEEDED
Status: ACTIVE | OUTPATIENT
Start: 2018-09-14 | End: 2018-09-14

## 2018-09-14 RX ORDER — MORPHINE SULFATE 4 MG/ML
1 INJECTION, SOLUTION INTRAMUSCULAR; INTRAVENOUS EVERY 2 HOUR PRN
Status: DISCONTINUED | OUTPATIENT
Start: 2018-09-14 | End: 2018-09-16

## 2018-09-14 RX ORDER — SODIUM CHLORIDE, SODIUM LACTATE, POTASSIUM CHLORIDE, CALCIUM CHLORIDE 600; 310; 30; 20 MG/100ML; MG/100ML; MG/100ML; MG/100ML
INJECTION, SOLUTION INTRAVENOUS CONTINUOUS
Status: DISCONTINUED | OUTPATIENT
Start: 2018-09-14 | End: 2018-09-14

## 2018-09-14 RX ORDER — OXYCODONE HYDROCHLORIDE 5 MG/1
5 TABLET ORAL EVERY 4 HOURS PRN
Status: DISCONTINUED | OUTPATIENT
Start: 2018-09-14 | End: 2018-09-15

## 2018-09-14 RX ORDER — RAMIPRIL 10 MG/1
20 CAPSULE ORAL EVERY EVENING
COMMUNITY
End: 2018-10-06

## 2018-09-14 RX ORDER — POLYETHYLENE GLYCOL 3350 17 G/17G
17 POWDER, FOR SOLUTION ORAL DAILY PRN
Status: DISCONTINUED | OUTPATIENT
Start: 2018-09-14 | End: 2018-09-16

## 2018-09-14 RX ORDER — HYDROCODONE BITARTRATE AND ACETAMINOPHEN 10; 325 MG/1; MG/1
1-2 TABLET ORAL EVERY 6 HOURS PRN
Qty: 60 TABLET | Refills: 0 | Status: SHIPPED | OUTPATIENT
Start: 2018-09-14 | End: 2019-01-09 | Stop reason: ALTCHOICE

## 2018-09-14 RX ORDER — MORPHINE SULFATE 4 MG/ML
4 INJECTION, SOLUTION INTRAMUSCULAR; INTRAVENOUS EVERY 2 HOUR PRN
Status: DISCONTINUED | OUTPATIENT
Start: 2018-09-14 | End: 2018-09-16

## 2018-09-14 RX ORDER — ALBUTEROL SULFATE 2.5 MG/3ML
2.5 SOLUTION RESPIRATORY (INHALATION) ONCE
Status: COMPLETED | OUTPATIENT
Start: 2018-09-14 | End: 2018-09-14

## 2018-09-14 RX ORDER — ACETAMINOPHEN 325 MG/1
650 TABLET ORAL ONCE
Status: COMPLETED | OUTPATIENT
Start: 2018-09-14 | End: 2018-09-14

## 2018-09-14 RX ORDER — SODIUM CHLORIDE, SODIUM LACTATE, POTASSIUM CHLORIDE, CALCIUM CHLORIDE 600; 310; 30; 20 MG/100ML; MG/100ML; MG/100ML; MG/100ML
INJECTION, SOLUTION INTRAVENOUS CONTINUOUS
Status: DISCONTINUED | OUTPATIENT
Start: 2018-09-14 | End: 2018-09-14 | Stop reason: HOSPADM

## 2018-09-14 RX ORDER — FLUTICASONE PROPIONATE 50 MCG
2 SPRAY, SUSPENSION (ML) NASAL DAILY
Status: DISCONTINUED | OUTPATIENT
Start: 2018-09-14 | End: 2018-09-16

## 2018-09-14 RX ORDER — BISACODYL 10 MG
10 SUPPOSITORY, RECTAL RECTAL
Status: DISCONTINUED | OUTPATIENT
Start: 2018-09-14 | End: 2018-09-16

## 2018-09-14 RX ORDER — METOCLOPRAMIDE HYDROCHLORIDE 5 MG/ML
10 INJECTION INTRAMUSCULAR; INTRAVENOUS EVERY 6 HOURS PRN
Status: DISPENSED | OUTPATIENT
Start: 2018-09-14 | End: 2018-09-16

## 2018-09-14 RX ORDER — KETOROLAC TROMETHAMINE 15 MG/ML
15 INJECTION, SOLUTION INTRAMUSCULAR; INTRAVENOUS EVERY 6 HOURS
Status: COMPLETED | OUTPATIENT
Start: 2018-09-14 | End: 2018-09-15

## 2018-09-14 RX ORDER — TIZANIDINE 2 MG/1
2 TABLET ORAL 3 TIMES DAILY PRN
Status: DISCONTINUED | OUTPATIENT
Start: 2018-09-14 | End: 2018-09-16

## 2018-09-14 RX ORDER — MULTIVITAMIN/IRON/FOLIC ACID 18MG-0.4MG
500 TABLET ORAL DAILY
Status: DISCONTINUED | OUTPATIENT
Start: 2018-09-14 | End: 2018-09-16

## 2018-09-14 RX ORDER — ALPRAZOLAM 0.25 MG/1
0.25 TABLET ORAL EVERY 6 HOURS PRN
Status: DISCONTINUED | OUTPATIENT
Start: 2018-09-14 | End: 2018-09-15

## 2018-09-14 RX ORDER — DOXEPIN HYDROCHLORIDE 50 MG/1
1 CAPSULE ORAL DAILY
Status: DISCONTINUED | OUTPATIENT
Start: 2018-09-14 | End: 2018-09-16

## 2018-09-14 RX ORDER — MORPHINE SULFATE 4 MG/ML
2 INJECTION, SOLUTION INTRAMUSCULAR; INTRAVENOUS EVERY 2 HOUR PRN
Status: DISCONTINUED | OUTPATIENT
Start: 2018-09-14 | End: 2018-09-16

## 2018-09-14 RX ORDER — ONDANSETRON 2 MG/ML
INJECTION INTRAMUSCULAR; INTRAVENOUS
Status: COMPLETED
Start: 2018-09-14 | End: 2018-09-14

## 2018-09-14 RX ORDER — ACETAMINOPHEN 325 MG/1
TABLET ORAL
Status: COMPLETED
Start: 2018-09-14 | End: 2018-09-14

## 2018-09-14 RX ORDER — ESZOPICLONE 3 MG/1
3 TABLET, FILM COATED ORAL NIGHTLY
COMMUNITY
End: 2018-10-12

## 2018-09-14 RX ORDER — ALBUTEROL SULFATE 2.5 MG/3ML
2.5 SOLUTION RESPIRATORY (INHALATION) EVERY 2 HOUR PRN
Status: DISCONTINUED | OUTPATIENT
Start: 2018-09-14 | End: 2018-09-16

## 2018-09-14 RX ORDER — OXYCODONE HYDROCHLORIDE 15 MG/1
15 TABLET ORAL EVERY 4 HOURS PRN
Status: DISCONTINUED | OUTPATIENT
Start: 2018-09-14 | End: 2018-09-15

## 2018-09-14 RX ORDER — MIDAZOLAM HYDROCHLORIDE 1 MG/ML
1 INJECTION INTRAMUSCULAR; INTRAVENOUS EVERY 5 MIN PRN
Status: DISCONTINUED | OUTPATIENT
Start: 2018-09-14 | End: 2018-09-14 | Stop reason: HOSPADM

## 2018-09-14 RX ORDER — GARLIC EXTRACT 500 MG
1 CAPSULE ORAL DAILY
Status: DISCONTINUED | OUTPATIENT
Start: 2018-09-14 | End: 2018-09-16

## 2018-09-14 RX ORDER — RAMIPRIL 5 MG/1
20 CAPSULE ORAL EVERY EVENING
Status: DISCONTINUED | OUTPATIENT
Start: 2018-09-14 | End: 2018-09-16

## 2018-09-14 RX ORDER — DEXAMETHASONE SODIUM PHOSPHATE 4 MG/ML
4 VIAL (ML) INJECTION AS NEEDED
Status: DISCONTINUED | OUTPATIENT
Start: 2018-09-14 | End: 2018-09-14 | Stop reason: HOSPADM

## 2018-09-14 RX ORDER — DOCUSATE SODIUM 100 MG/1
100 CAPSULE, LIQUID FILLED ORAL 2 TIMES DAILY
Status: DISCONTINUED | OUTPATIENT
Start: 2018-09-14 | End: 2018-09-16

## 2018-09-14 RX ORDER — SODIUM CHLORIDE 9 MG/ML
INJECTION, SOLUTION INTRAVENOUS CONTINUOUS
Status: DISCONTINUED | OUTPATIENT
Start: 2018-09-14 | End: 2018-09-15

## 2018-09-14 RX ORDER — SENNOSIDES 8.6 MG
17.2 TABLET ORAL NIGHTLY
Status: DISCONTINUED | OUTPATIENT
Start: 2018-09-14 | End: 2018-09-16

## 2018-09-14 RX ORDER — OXYCODONE HYDROCHLORIDE 10 MG/1
10 TABLET ORAL EVERY 4 HOURS PRN
Status: DISCONTINUED | OUTPATIENT
Start: 2018-09-14 | End: 2018-09-15

## 2018-09-14 RX ORDER — ACETAMINOPHEN 500 MG
1000 TABLET ORAL ONCE
Status: DISCONTINUED | OUTPATIENT
Start: 2018-09-14 | End: 2018-09-14 | Stop reason: HOSPADM

## 2018-09-14 RX ORDER — MEPERIDINE HYDROCHLORIDE 25 MG/ML
12.5 INJECTION INTRAMUSCULAR; INTRAVENOUS; SUBCUTANEOUS AS NEEDED
Status: COMPLETED | OUTPATIENT
Start: 2018-09-14 | End: 2018-09-14

## 2018-09-14 RX ORDER — NALOXONE HYDROCHLORIDE 0.4 MG/ML
80 INJECTION, SOLUTION INTRAMUSCULAR; INTRAVENOUS; SUBCUTANEOUS AS NEEDED
Status: DISCONTINUED | OUTPATIENT
Start: 2018-09-14 | End: 2018-09-14 | Stop reason: HOSPADM

## 2018-09-14 RX ORDER — CEFAZOLIN SODIUM/WATER 2 G/20 ML
2 SYRINGE (ML) INTRAVENOUS ONCE
Status: DISCONTINUED | OUTPATIENT
Start: 2018-09-14 | End: 2018-09-14 | Stop reason: HOSPADM

## 2018-09-14 RX ORDER — ALBUTEROL SULFATE 2.5 MG/3ML
SOLUTION RESPIRATORY (INHALATION)
Status: COMPLETED
Start: 2018-09-14 | End: 2018-09-14

## 2018-09-14 RX ORDER — ACETAMINOPHEN 325 MG/1
650 TABLET ORAL 4 TIMES DAILY
Status: DISCONTINUED | OUTPATIENT
Start: 2018-09-14 | End: 2018-09-15

## 2018-09-14 RX ORDER — ONDANSETRON 2 MG/ML
4 INJECTION INTRAMUSCULAR; INTRAVENOUS AS NEEDED
Status: DISCONTINUED | OUTPATIENT
Start: 2018-09-14 | End: 2018-09-14 | Stop reason: HOSPADM

## 2018-09-14 RX ORDER — MEPERIDINE HYDROCHLORIDE 25 MG/ML
INJECTION INTRAMUSCULAR; INTRAVENOUS; SUBCUTANEOUS
Status: COMPLETED
Start: 2018-09-14 | End: 2018-09-14

## 2018-09-14 RX ORDER — PANTOPRAZOLE SODIUM 40 MG/1
40 TABLET, DELAYED RELEASE ORAL
Status: DISCONTINUED | OUTPATIENT
Start: 2018-09-15 | End: 2018-09-16

## 2018-09-14 RX ORDER — OMEGA-3 FATTY ACIDS CAP DELAYED RELEASE 1000 MG 1000 MG
2 CAPSULE DELAYED RELEASE ORAL DAILY
Status: DISCONTINUED | OUTPATIENT
Start: 2018-09-14 | End: 2018-09-14 | Stop reason: RX

## 2018-09-14 RX ORDER — DIPHENHYDRAMINE HYDROCHLORIDE 50 MG/ML
12.5 INJECTION INTRAMUSCULAR; INTRAVENOUS EVERY 4 HOURS PRN
Status: DISCONTINUED | OUTPATIENT
Start: 2018-09-14 | End: 2018-09-16

## 2018-09-14 RX ORDER — ONDANSETRON 2 MG/ML
4 INJECTION INTRAMUSCULAR; INTRAVENOUS EVERY 4 HOURS PRN
Status: DISCONTINUED | OUTPATIENT
Start: 2018-09-14 | End: 2018-09-16

## 2018-09-14 RX ORDER — CEFAZOLIN SODIUM/WATER 2 G/20 ML
2 SYRINGE (ML) INTRAVENOUS EVERY 8 HOURS
Status: COMPLETED | OUTPATIENT
Start: 2018-09-14 | End: 2018-09-15

## 2018-09-14 NOTE — ANESTHESIA POSTPROCEDURE EVALUATION
[address] [facility] Patient Status:  Hospital Outpatient Surgery   Age/Gender 76year old female MRN [id number]   [facility] SURGERY Attending Jovanni Yuan MD   Hosp Day # 0 PCP Josiane Antunez DO       Anesthesia Post-op Note

## 2018-09-14 NOTE — PLAN OF CARE
Nebs ordered q 2 hrs prn,-wheezing noted bilat. , non productive cough noted, dr. Didi Velasco notified, Chest -ray pa & lat. Ordered.

## 2018-09-14 NOTE — PHYSICAL THERAPY NOTE
Attempted to see patient for PT evaluation this time. Patient presented with Left LE weakness,numbness,poor activation of left quads during SAQ  & nauseated. Not appropriate for evaluation this time.  Will initiated mobility assessment in morning of 09/15/18

## 2018-09-14 NOTE — ANESTHESIA PREPROCEDURE EVALUATION
PRE-OP EVALUATION    Patient Name: Juana Olvera    Pre-op Diagnosis: Primary osteoarthritis of left hip [M16.12]    Procedure(s):  LEFT TOTAL HIP ARTHROPLASTY    Surgeon(s) and Role:     Jan Dupree MD - Primary    Pre-op vitals reviewed. Temp: 97. 5 MG/ML Subcutaneous Solution Auto-injector INJECT 50MG SUB-Q EVERY 7 DAYS Disp: 12 mL Rfl: 1   Oxybutynin Chloride ER 10 MG Oral Tablet 24 Hr Take 10 mg by mouth once daily.    Disp: 90 tablet Rfl: 2   Fluticasone Propionate 50 MCG/ACT Nasal Suspension 2 spr Jan Cruz DO;  Location: [address] [place] [address]  1/10/2014: ESOPHAGOGASTRODUODENOSCOPY, POSSIBLE BIOPSY, POSSIBLE   POLYPECTOMY 19671; N/A      Comment:  Performed by Joe Newton MD at [address] [place] [address]  5/25/201 MANAGEMENT  4/1/2015: TRANSFORAMINAL EPIDURAL - LUMBAR; N/A      Comment:  Performed by Dany Valdez MD at [address] [place] [address],[address]  3/16/2015: TRANSFORAMINAL EPIDURAL - LUMBAR; Left      Comment:  Performed by Dany Valdez MD block risks and benefits discussed. Questions answered.     Plan/risks discussed with: patient and spouse                Present on Admission:  **None**

## 2018-09-14 NOTE — BRIEF OP NOTE
Pre-Operative Diagnosis: Primary osteoarthritis of left hip [M16.12]     Post-Operative Diagnosis: Primary osteoarthritis of left hip [M16.12]      Procedure Performed:   Procedure(s):  LEFT TOTAL HIP ARTHROPLASTY    Surgeon(s) and Role:     Dee Prieto

## 2018-09-14 NOTE — INTERVAL H&P NOTE
Pre-op Diagnosis: Primary osteoarthritis of left hip [M16.12]    The above referenced H&P was reviewed by Ming Banks PA-C on 9/14/2018, the patient was examined and no significant changes have occurred in the patient's condition since the H&P was

## 2018-09-14 NOTE — CONSULTS
[facility] Hospitalist H&P       CC: medicine consult for comanagement of medical conditions     PCP: Patricia Cage DO    History of Present Illness: Patient is a 76year old female with PMH sig for asthma, RA, allergic rhinitis and GERD is admitted for planne ESOPHAGOGASTRODUODENOSCOPY, POSSIBLE BIOPSY, POSSIBLE   POLYPECTOMY 59923; N/A      Comment:  Performed by Hieu Mckoy MD at [address] [place] [address]  5/25/2016: FLUOROSCOPIC GUIDANCE NEEDLE PLACEMENT; Right      Comment:  Procedure: HIP Performed by Moises Forbes MD at [address] [place] [address],[address]  3/16/2015: TRANSFORAMINAL EPIDURAL - LUMBAR; Left      Comment:  Performed by Moises Forbes MD at [address] [place] [address],[address]  1/10/14= Hiatal hernia: UPP Disp: 90 tablet Rfl: 2   Fluticasone Propionate 50 MCG/ACT Nasal Suspension 2 sprays by Each Nare route daily as needed for Rhinitis. Disp:  Rfl:    SALINE MIST SPRAY NA 1 spray by Nasal route as needed.    Disp:  Rfl:    Multiple Vitamin (DAILY JING) Oral enlargment/tenderness/nodules appreciated   Lungs:   Clear to auscultation bilaterally. Normal effort   Chest wall:  No tenderness or deformity.    Heart:  Regular rate and rhythm, S1, S2 normal, no murmur, rub or gallop appreciated   Abdomen:   Soft, non-t the femoral head. MD GALINA Mayberry/[facility] - PT NAME: Lindy Willson MRN: [id number] DD: 08/25/2018 08:13 TD: 08/25/2018 08:46 Job #: [id number]    Xr Hip W Or Wo Pelvis 1 View, Left (cpt=73501)    Result Date: 9/14/2018  PROCEDURE:  XR HIP W OR WO PEL ask questions and note understanding and agreeing with therapeutic plan as outlined    Thank You,  Radhika Ferreira DO  [facility] Hospitalist  Pager [phone]  Answering Service number: [phone]

## 2018-09-14 NOTE — BRIEF OP NOTE
Pre-Operative Diagnosis: Primary osteoarthritis of left hip [M16.12]     Post-Operative Diagnosis: Primary osteoarthritis of left hip [M16.12]      Procedure Performed:   Procedure(s):  LEFT TOTAL HIP ARTHROPLASTY    Surgeon(s) and Role:     Kristal Watkins

## 2018-09-15 LAB
ANION GAP SERPL CALC-SCNC: 8 MMOL/L (ref 0–18)
BUN BLD-MCNC: 21 MG/DL (ref 8–20)
BUN/CREAT SERPL: 22.6 (ref 10–20)
CALCIUM BLD-MCNC: 8 MG/DL (ref 8.3–10.3)
CHLORIDE SERPL-SCNC: 107 MMOL/L (ref 101–111)
CO2 SERPL-SCNC: 23 MMOL/L (ref 22–32)
CREAT BLD-MCNC: 0.93 MG/DL (ref 0.55–1.02)
ERYTHROCYTE [DISTWIDTH] IN BLOOD BY AUTOMATED COUNT: 15.9 % (ref 11.5–16)
GLUCOSE BLD-MCNC: 113 MG/DL (ref 70–99)
HCT VFR BLD AUTO: 29.4 % (ref 34–50)
HGB BLD-MCNC: 9.5 G/DL (ref 12–16)
MCH RBC QN AUTO: 30.4 PG (ref 27–33.2)
MCHC RBC AUTO-ENTMCNC: 32.3 G/DL (ref 31–37)
MCV RBC AUTO: 93.9 FL (ref 81–100)
OSMOLALITY SERPL CALC.SUM OF ELEC: 290 MOSM/KG (ref 275–295)
PLATELET # BLD AUTO: 206 10(3)UL (ref 150–450)
POTASSIUM SERPL-SCNC: 4.2 MMOL/L (ref 3.6–5.1)
RBC # BLD AUTO: 3.13 X10(6)UL (ref 3.8–5.1)
RED CELL DISTRIBUTION WIDTH-SD: 54.1 FL (ref 35.1–46.3)
SODIUM SERPL-SCNC: 138 MMOL/L (ref 136–144)
WBC # BLD AUTO: 9.1 X10(3) UL (ref 4–13)

## 2018-09-15 PROCEDURE — 97166 OT EVAL MOD COMPLEX 45 MIN: CPT

## 2018-09-15 PROCEDURE — 97116 GAIT TRAINING THERAPY: CPT

## 2018-09-15 PROCEDURE — 97150 GROUP THERAPEUTIC PROCEDURES: CPT

## 2018-09-15 PROCEDURE — 97535 SELF CARE MNGMENT TRAINING: CPT

## 2018-09-15 PROCEDURE — 80048 BASIC METABOLIC PNL TOTAL CA: CPT | Performed by: PHYSICIAN ASSISTANT

## 2018-09-15 PROCEDURE — 97530 THERAPEUTIC ACTIVITIES: CPT

## 2018-09-15 PROCEDURE — 85027 COMPLETE CBC AUTOMATED: CPT | Performed by: PHYSICIAN ASSISTANT

## 2018-09-15 PROCEDURE — 97162 PT EVAL MOD COMPLEX 30 MIN: CPT

## 2018-09-15 RX ORDER — LORAZEPAM 2 MG/ML
0.25 INJECTION INTRAMUSCULAR EVERY 6 HOURS PRN
Status: DISCONTINUED | OUTPATIENT
Start: 2018-09-15 | End: 2018-09-16

## 2018-09-15 RX ORDER — ACETAMINOPHEN 325 MG/1
325 TABLET ORAL EVERY 4 HOURS PRN
Status: DISCONTINUED | OUTPATIENT
Start: 2018-09-15 | End: 2018-09-16

## 2018-09-15 RX ORDER — HYDROCODONE BITARTRATE AND ACETAMINOPHEN 10; 325 MG/1; MG/1
1 TABLET ORAL EVERY 4 HOURS PRN
Status: DISCONTINUED | OUTPATIENT
Start: 2018-09-15 | End: 2018-09-16

## 2018-09-15 RX ORDER — HYDROCODONE BITARTRATE AND ACETAMINOPHEN 10; 325 MG/1; MG/1
2 TABLET ORAL EVERY 4 HOURS PRN
Status: DISCONTINUED | OUTPATIENT
Start: 2018-09-15 | End: 2018-09-16

## 2018-09-15 RX ORDER — HYDROCODONE BITARTRATE AND ACETAMINOPHEN 10; 325 MG/1; MG/1
1 TABLET ORAL EVERY 4 HOURS PRN
Status: DISCONTINUED | OUTPATIENT
Start: 2018-09-16 | End: 2018-09-15

## 2018-09-15 RX ORDER — TRAMADOL HYDROCHLORIDE 50 MG/1
50 TABLET ORAL EVERY 6 HOURS PRN
Status: DISCONTINUED | OUTPATIENT
Start: 2018-09-15 | End: 2018-09-16

## 2018-09-15 RX ORDER — ALPRAZOLAM 0.25 MG/1
0.25 TABLET ORAL EVERY 6 HOURS PRN
Status: DISCONTINUED | OUTPATIENT
Start: 2018-09-15 | End: 2018-09-16

## 2018-09-15 RX ORDER — HYDROCODONE BITARTRATE AND ACETAMINOPHEN 10; 325 MG/1; MG/1
2 TABLET ORAL EVERY 4 HOURS PRN
Status: DISCONTINUED | OUTPATIENT
Start: 2018-09-16 | End: 2018-09-15

## 2018-09-15 RX ORDER — ACETAMINOPHEN 325 MG/1
650 TABLET ORAL EVERY 4 HOURS PRN
Status: DISCONTINUED | OUTPATIENT
Start: 2018-09-15 | End: 2018-09-16

## 2018-09-15 NOTE — HOME CARE LIAISON
I met with patient at bedside and she is confirmed and agreeable to [facility]. She anticipates discharge home Sunday. Brochure left at bedside, thanks.

## 2018-09-15 NOTE — PHYSICAL THERAPY NOTE
No abductor pillow present in patient room. Notified and spoke with nursing Marce Wise). As per orders, patient should have abductor pillow at all times.

## 2018-09-15 NOTE — PHYSICAL THERAPY NOTE
PHYSICAL THERAPY HIP EVALUATION - INPATIENT     Room Number: 365/365-A  Evaluation Date: 9/15/2018  Type of Evaluation: Initial  Physician Order: PT Eval and Treat    Presenting Problem: s/p L P DARRICK  Reason for Therapy: Mobility Dysfunction and Discharge P POSSIBLE BIOPSY, POSSIBLE   POLYPECTOMY 58618; N/A      Comment:  Performed by Reji Huizar MD at [address] [place] [address]  5/25/2016: FLUOROSCOPIC GUIDANCE NEEDLE PLACEMENT; Right      Comment:  Procedure: HIP INJECTION;  Surgeon: Loco Varghese Neeru Gamboa MD at [address] [place] [address],[address]  3/16/2015: TRANSFORAMINAL EPIDURAL - LUMBAR; Left      Comment:  Performed by Sharda Junior MD at [address] [place] [address],[address]  1/10/14= Hiatal hernia: UPPER GI ENDOSCOPY PERFO TOLERANCE  Room air  No shortness of breath  Blood Pressure: 254 systolic in sitting, 98 systolic in standing    AM-PAC '6-Clicks' INPATIENT SHORT FORM - BASIC MOBILITY  How much difficulty does the patient currently have. ..  -   Turning over in bed (inclu Provided:  Bed mobility  Energy conservation  Functional activity tolerated  Gait training  Transfer training    Patient End of Session: Up in chair;Needs met;Call light within reach;RN aware of session/findings; All patient questions and concerns addressed Patient verbalizes and/or demonstrates all precautions and safety concerns independently   Goal #6        Goal Comments: Goals established on 9/15/2018

## 2018-09-15 NOTE — PROGRESS NOTES
[address] Patient Status:  Inpatient    1950 MRN [id number]   [facility] 3SW-A Attending Eddie Scanlon MD   [facility] [place] [facility] # 1 PCP Yoselin Perales DO     Subjective:  LeftTotal Hip Arthroplasty POD #1  Systemic or Speci

## 2018-09-15 NOTE — OCCUPATIONAL THERAPY NOTE
OCCUPATIONAL THERAPY EVALUATION - INPATIENT     Room Number: 365/365-A  Evaluation Date: 9/15/2018  Type of Evaluation: Initial  Presenting Problem: L DARRICK elective 9/14/18    Physician Order: IP Consult to Occupational Therapy  Reason for Therapy: ADL/IADL ;  Location: [address] [place] [address]  1/10/2014: ESOPHAGOGASTRODUODENOSCOPY, POSSIBLE BIOPSY, POSSIBLE   POLYPECTOMY 59058; N/A      Comment:  Performed by Anna Sheridan MD at [address] [place] [address]  5/25/2016: [facility] Lee MANAGEMENT  4/1/2015: TRANSFORAMINAL EPIDURAL - LUMBAR; N/A      Comment:  Performed by Sweta Nieto MD at [place]  3/16/2015: TRANSFORAMINAL EPIDURAL - LUMBAR; Left      Comment:  Performed by Sweta Nieto MD at ASSESSMENT  Upper extremity AROM is within functional limits for BUE    Upper extremity strength is within functional limits for BUE    COORDINATION  Gross Motor    [facility]    Fine Motor    WFL      ADDITIONAL TESTS       NEUROLOGICAL FINDINGS  Neurological Fi safe rw use during ADLs/IADLs and decreasing fall risk in home environment. Pt with chronic shoulder and wrist pain from RA but able to use RW. Pt with h/o back issues and typically completes log roll for bed mobility.   Pt will benefit from further bed mo Comorbidities and min to mod modifications of tasks    Clinical Decision Making MODERATE - Analysis of occupational profile, detailed assessments, several treatment options    Overall Complexity MODERATE        OT Device Recommendations: None    PLAN  OT T

## 2018-09-15 NOTE — PHYSICAL THERAPY NOTE
PHYSICAL THERAPY HIP TREATMENT NOTE - INPATIENT      Room Number: 365/365-A     Session: 1 and 2  Number of Visits to Meet Established Goals: 3    Presenting Problem: s/p L P DARRICK    Problem List  Active Problems:    Primary osteoarthritis of left hip SURGERY  5/25/2016: HIP INJECTION; Right      Comment:  Performed by Corinne Salazar DO at [address]  06/2017: HIP REPLACEMENT SURGERY;  Right  6/30/2017: HIP TOTAL REPLACEMENT; Right      Comment:  Performed by Lucía Solano ESOPHAGOGASTRODUODENOSCOPY, POSSIBLE BIOPSY,                POSSIBLE POLYPECTOMY 36354;  Surgeon: Henrietta Iqbal MD;               Location: [address] [place] [address] Avenue to participation x 2.  Reports was throwing up during lunch and a assistance(score based on FIM, actual CGA)       Skilled Therapy Provided:   Morning session: VC and demonstration with exercises.  present, good family support. STS with CGA. Patient ambulated for 61' in hallway with CGA and RW.  Patient had to sit afternoon session. Patient CGA with STS, ambulation, and 4 stairs this date by afternoon session. Good family support. DISCHARGE RECOMMENDATIONS  PT Discharge Recommendations: Home with home health PT    PLAN  PT Treatment Plan: Bed mobility; Endurance;

## 2018-09-15 NOTE — OPERATIVE REPORT
[address]    PATIENT'S NAME: Enma Keene   ATTENDING PHYSICIAN: Fan Gentile M.D. OPERATING PHYSICIAN: Fan Gentile M.D.    PATIENT ACCOUNT#:   [de-identified]    LOCATION:  [address] [place] [address]  MEDICAL RECORD #:   [id number]       DATE OF BIRTH:  [birth year] fascia of the gluteus anthony. Sharp scalpel was used to incise the fascia tj and fascia of the gluteus anthony, and the fibers of the gluteus anthony were gently teased apart with a gloved digit. Self-retaining Charnley retractor was positioned.   The create a  hole in the area of the piriformis sinus. A T-handled reamer was placed down the  hole. The lateralizer was used to enlarge the  hole laterally. An anteversion osteotome was used to remove bone from the proximal metaphysis.   I b femur.  I repositioned my size 11 broach. I chose the reduced neck length prosthesis with standard offset.   I used this on the opposite side and had an excellent restoration of stability with full extension and external rotation as well as full flexion, a process. Limb lengths were measured and they were equal.  The patient tolerated the procedure and went to the recovery room in stable condition.   The intraoperative findings were discussed later with the patient's , and postoperative instructions w

## 2018-09-15 NOTE — PROGRESS NOTES
[facility] Hospitalist Progress Note     PCP: Venkatesh Rivers DO    SUBJECTIVE:  No CP, SOB. Pt feels well today. Still nauseated but no further chest tightness since yesterday evening.     OBJECTIVE:  Temp:  [97.4 °F (36.3 °C)-98.6 °F (37 °C)] 98.6 °F (37 °C) [place]    • ramipril  20 mg Oral QPM     • sodium chloride 125 mL/hr at 09/14/18 2039     ALPRAZolam **OR** LORazepam, TraMADol HCl, HYDROcodone-acetaminophen **OR** HYDROcodone-acetaminophen, acetaminophen **OR** acetaminophen, sodium chloride 0.9%, PEG 33

## 2018-09-16 VITALS
HEIGHT: 69 IN | OXYGEN SATURATION: 98 % | TEMPERATURE: 98 F | HEART RATE: 89 BPM | WEIGHT: 190.69 LBS | RESPIRATION RATE: 18 BRPM | SYSTOLIC BLOOD PRESSURE: 124 MMHG | DIASTOLIC BLOOD PRESSURE: 68 MMHG | BODY MASS INDEX: 28.24 KG/M2

## 2018-09-16 LAB
ERYTHROCYTE [DISTWIDTH] IN BLOOD BY AUTOMATED COUNT: 15.9 % (ref 11.5–16)
HCT VFR BLD AUTO: 29.6 % (ref 34–50)
HGB BLD-MCNC: 9.5 G/DL (ref 12–16)
MCH RBC QN AUTO: 30.3 PG (ref 27–33.2)
MCHC RBC AUTO-ENTMCNC: 32.1 G/DL (ref 31–37)
MCV RBC AUTO: 94.3 FL (ref 81–100)
PLATELET # BLD AUTO: 215 10(3)UL (ref 150–450)
RBC # BLD AUTO: 3.14 X10(6)UL (ref 3.8–5.1)
RED CELL DISTRIBUTION WIDTH-SD: 54.3 FL (ref 35.1–46.3)
WBC # BLD AUTO: 9.4 X10(3) UL (ref 4–13)

## 2018-09-16 PROCEDURE — 97530 THERAPEUTIC ACTIVITIES: CPT

## 2018-09-16 PROCEDURE — 85027 COMPLETE CBC AUTOMATED: CPT | Performed by: PHYSICIAN ASSISTANT

## 2018-09-16 PROCEDURE — 97150 GROUP THERAPEUTIC PROCEDURES: CPT

## 2018-09-16 PROCEDURE — 97535 SELF CARE MNGMENT TRAINING: CPT

## 2018-09-16 NOTE — PROGRESS NOTES
Acute Pain Service    Post Op Day 2 Ortho Note    Assessed patient in chair. Patient states she has been trying not to take pain medications so pain is severe at this time; RN administered Norco prior to Group PT; denies itching/nausea/dizziness.     Snaaz

## 2018-09-16 NOTE — OCCUPATIONAL THERAPY NOTE
OCCUPATIONAL THERAPY TREATMENT NOTE - INPATIENT     Room Number: 365/365-A  Session: 1/3   Number of Visits to Meet Established Goals: 3    Presenting Problem: L DARRICK elective 9/14/18    History related to current admission: Pt is 76year old female admitte POSSIBLE   POLYPECTOMY 93914; N/A      Comment:  Performed by Ledy Wood MD at [address] [place] [address]  5/25/2016: [address]; Right      Comment:  Procedure: HIP INJECTION;  Surgeon: Alix Collins, [facility]                MANAGEMENT  3/16/2015: TRANSFORAMINAL EPIDURAL - LUMBAR; Left      Comment:  Performed by Rachael Lobato MD at [facility]  1/10/14= Hiatal hernia: UPPER GI ENDOSCOPY PERFORMED  1/10/2014: U tested(pt up in chair at start of therapy session)  Sit to Stand: Minimum assistance (actual CGA using RW)    Skilled Therapy Provided: Pt presented up in chair at start of therapy session. Pt agreeable to participate in therapy.  Pt educated on role of OT, education;Equipment eval/education;Patient/Family training  Rehab Potential : Good  Frequency (Obs): 5x/week      OT Goals:   ADL Goals  Pt will perform grooming in stand w/ supervision   Pt will perform LB dressing w/ supervision and AE as needed keeping

## 2018-09-16 NOTE — PROGRESS NOTES
[address] Patient Status:  Inpatient    1950 MRN [id number]   [facility] 3SW-A Attending Alana Bolivar MD   [facility] Day # 2 PCP Osito Tian DO     Subjective:  LeftTotal Hip Arthroplasty.  POD #2  Systemic or Spec

## 2018-09-17 PROBLEM — Z47.89 ORTHOPEDIC AFTERCARE: Status: ACTIVE | Noted: 2018-09-17

## 2018-09-17 NOTE — PHYSICAL THERAPY NOTE
PHYSICAL THERAPY HIP TREATMENT NOTE - INPATIENT      Room Number: 365/365-A     Session:   Number of Visits to Meet Established Goals: 3    Presenting Problem: s/p posterior L THR    Problem List  Active Problems:    Primary osteoarthritis of left hip SURGERY  5/25/2016: HIP INJECTION; Right      Comment:  Performed by Kelsie Marques DO at [facility] [address] 06/2017: HIP REPLACEMENT SURGERY;  Right  6/30/2017: HIP TOTAL REPLACEMENT; Right      Comment:  Performed by Nemesio Pickard ESOPHAGOGASTRODUODENOSCOPY, POSSIBLE BIOPSY,                POSSIBLE POLYPECTOMY 28762;  Surgeon: Rossana Velazquez MD;               Location: [address] [place] [address] Road  \"Can I use something to help get my foot off the bed?\"    Patient’s self Session    Ankle Pumps     20 reps    Quad Sets 20 reps    Glut Sets 20 reps    Hip Abd/Add 20 reps    Heel slides 20 reps    Saq 20 reps    Sitting Knee Extension 20 reps    Standing heel/toe raises 20 reps    Hamstring Curls 20 reps    Forward, back step

## 2018-10-19 NOTE — PAYOR COMM NOTE
--------------  ADMISSION REVIEW     Payor: Lalita Bell #:  [id number]  Authorization Number: [id number]    Admit date: 9/14/18  Admit time: 56       Admitting Physician: Andrew Carballo MD  Attending Physician:  No att. providers found wound infection, DVT, limb length discrepancy, dislocation, and ongoing pain and discomfort. Despite these and other risks, the patient does wish to proceed. She has no medical contraindication to the proposed surgery.     VITAL SIGNS:  Stable; temperatur presently  -observe - if this recurs, pt spikes fever, becomes hypoxic, will obtain CXR     Nausea/emesis  -suspect from anesthesia  -zofran prn  -IVF until taking PO     RA  -holding biologics  -involvement in lungs likely - follows with Dr. Evans Room as out

## 2019-01-03 ENCOUNTER — OFFICE VISIT (OUTPATIENT)
Dept: FAMILY MEDICINE CLINIC | Facility: CLINIC | Age: 69
End: 2019-01-03
Payer: COMMERCIAL

## 2019-01-03 VITALS
DIASTOLIC BLOOD PRESSURE: 70 MMHG | HEART RATE: 100 BPM | OXYGEN SATURATION: 98 % | SYSTOLIC BLOOD PRESSURE: 102 MMHG | WEIGHT: 190 LBS | TEMPERATURE: 97 F | RESPIRATION RATE: 20 BRPM | HEIGHT: 69 IN | BODY MASS INDEX: 28.14 KG/M2

## 2019-01-03 DIAGNOSIS — J40 BRONCHITIS: Primary | ICD-10-CM

## 2019-01-03 PROCEDURE — 99213 OFFICE O/P EST LOW 20 MIN: CPT | Performed by: NURSE PRACTITIONER

## 2019-01-03 RX ORDER — PREDNISONE 20 MG/1
TABLET ORAL
Qty: 10 TABLET | Refills: 0 | Status: SHIPPED | OUTPATIENT
Start: 2019-01-03 | End: 2019-01-09 | Stop reason: ALTCHOICE

## 2019-01-03 NOTE — PROGRESS NOTES
CHIEF COMPLAINT:   Patient presents with:  Cough        HPI:   Librado Galvin is a 76year old female who presents for cough for  6  days. Cough started gradually and is described as dry and hacking. Patient reports history of bronchitis.  Cough is nonproduc 500 MG Oral Tab Take 500 mg by mouth daily. Disp:  Rfl:    Misc Natural Products (FOCUSED MIND OR) Take 1 tablet by mouth daily.  Disp:  Rfl:    Pantoprazole Sodium 40 MG Oral Tab EC Take 1 tablet (40 mg total) by mouth every morning before breakfast. Disp: GENERAL: see HPI  SKIN: No rashes, or other skin lesions. EYES: Denies blurred vision or double vision. HENT:  See HPI  CARDIOVASCULAR: Denies palpitations  LUNGS: Per HPI. GI: Denies N/V/C/D or abdominal pain.       EXAM:   /70   Pulse 100   T Bronchitis can also happen when a person gets an infection called “whooping cough,” but this is not as common. Whooping cough is caused by bacteria that can infect the bronchi.  Most people get vaccines that prevent whooping cough, but the vaccine doesn’t out of your body. One of the best ways to do this is to wash your hands often with soap and water. If there is no sink nearby, you can use a hand gel with alcohol in it to clean your hands.    How can I keep from spreading my germs? — In addition to washing

## 2019-02-04 PROBLEM — J40 BRONCHITIS: Status: RESOLVED | Noted: 2019-01-03 | Resolved: 2019-02-04

## 2019-02-04 PROBLEM — Z47.89 ORTHOPEDIC AFTERCARE: Status: RESOLVED | Noted: 2018-09-17 | Resolved: 2019-02-04

## 2019-02-04 PROBLEM — Z96.643 HISTORY OF BILATERAL HIP REPLACEMENTS: Status: ACTIVE | Noted: 2018-06-26

## 2019-08-22 ENCOUNTER — HOSPITAL (OUTPATIENT)
Dept: OTHER | Age: 69
End: 2019-08-22

## 2019-08-23 LAB — PATHOLOGIST NAME: NORMAL

## 2019-09-11 ENCOUNTER — HOSPITAL (OUTPATIENT)
Dept: OTHER | Age: 69
End: 2019-09-11
Attending: INTERNAL MEDICINE

## 2020-01-02 ENCOUNTER — HOSPITAL (OUTPATIENT)
Dept: OTHER | Age: 70
End: 2020-01-02
Attending: INTERNAL MEDICINE

## 2020-01-14 ENCOUNTER — HOSPITAL (OUTPATIENT)
Dept: OTHER | Age: 70
End: 2020-01-14

## 2020-01-15 LAB — PATHOLOGIST NAME: NORMAL

## 2020-05-07 ENCOUNTER — APPOINTMENT (OUTPATIENT)
Dept: GENERAL RADIOLOGY | Facility: HOSPITAL | Age: 70
End: 2020-05-07
Attending: NURSE PRACTITIONER
Payer: MEDICARE

## 2020-05-07 ENCOUNTER — HOSPITAL ENCOUNTER (EMERGENCY)
Facility: HOSPITAL | Age: 70
Discharge: HOME | End: 2020-05-07
Attending: EMERGENCY MEDICINE
Payer: MEDICARE

## 2020-05-07 VITALS
TEMPERATURE: 98 F | SYSTOLIC BLOOD PRESSURE: 164 MMHG | HEART RATE: 79 BPM | BODY MASS INDEX: 29 KG/M2 | WEIGHT: 194 LBS | RESPIRATION RATE: 14 BRPM | OXYGEN SATURATION: 96 % | DIASTOLIC BLOOD PRESSURE: 73 MMHG

## 2020-05-07 DIAGNOSIS — S73.004A HIP DISLOCATION, RIGHT, INITIAL ENCOUNTER (HCC): Primary | ICD-10-CM

## 2020-05-07 PROCEDURE — 99285 EMERGENCY DEPT VISIT HI MDM: CPT

## 2020-05-07 PROCEDURE — 73502 X-RAY EXAM HIP UNI 2-3 VIEWS: CPT | Performed by: NURSE PRACTITIONER

## 2020-05-07 PROCEDURE — 73501 X-RAY EXAM HIP UNI 1 VIEW: CPT | Performed by: NURSE PRACTITIONER

## 2020-05-07 PROCEDURE — 27265 TREAT HIP DISLOCATION: CPT

## 2020-05-07 PROCEDURE — 85025 COMPLETE CBC W/AUTO DIFF WBC: CPT | Performed by: NURSE PRACTITIONER

## 2020-05-07 PROCEDURE — 96375 TX/PRO/DX INJ NEW DRUG ADDON: CPT

## 2020-05-07 PROCEDURE — 80053 COMPREHEN METABOLIC PANEL: CPT | Performed by: NURSE PRACTITIONER

## 2020-05-07 PROCEDURE — 96374 THER/PROPH/DIAG INJ IV PUSH: CPT

## 2020-05-07 RX ORDER — ONDANSETRON 2 MG/ML
4 INJECTION INTRAMUSCULAR; INTRAVENOUS ONCE
Status: COMPLETED | OUTPATIENT
Start: 2020-05-07 | End: 2020-05-07

## 2020-05-07 RX ORDER — MORPHINE SULFATE 4 MG/ML
4 INJECTION, SOLUTION INTRAMUSCULAR; INTRAVENOUS ONCE
Status: COMPLETED | OUTPATIENT
Start: 2020-05-07 | End: 2020-05-07

## 2020-05-07 RX ORDER — HYDROCODONE BITARTRATE AND ACETAMINOPHEN 5; 325 MG/1; MG/1
1 TABLET ORAL EVERY 6 HOURS PRN
Qty: 10 TABLET | Refills: 0 | Status: SHIPPED | OUTPATIENT
Start: 2020-05-07 | End: 2020-05-14

## 2020-05-07 NOTE — ED PROVIDER NOTES
Patient Seen in: [facility] Emergency Department      History   Patient presents with:  Lower Extremity Injury    Stated Complaint: Hip pain    HPI  80 yo female with history of right hip replacement that dislocated a few weeks post op 3 years ago p HISTORY      gonzalez neuroma removal codie x 3   • TRANSFORAMINAL EPIDURAL - LUMBAR Left 4/16/2015    Performed by Rona High MD at [address]   • TRANSFORAMINAL EPIDURAL - LUMBAR N/A 4/1/2015    Performed by Ann Roth MD at D muscular tenderness. Cardiovascular:      Rate and Rhythm: Normal rate and regular rhythm. Heart sounds: Normal heart sounds. Pulmonary:      Effort: Pulmonary effort is normal.      Breath sounds: Normal breath sounds.    Chest:      Chest wall: N Xr Hip W Or Wo Pelvis 1 View, Right (cpt=73501)    Result Date: 5/7/2020  PROCEDURE:  XR HIP W OR WO PELVIS 1 VIEW, RIGHT (CPT=73501)  TECHNIQUE:  Unilateral view of the hip.   COMPARISON:  EDWARD , XR, XR HIP W OR WO PELVIS 2 OR 3 VIEWS, RIGHT (CPT=73502 posterior with respect to the right acetabular cup. A discrete fracture is not clearly seen, however continued follow-up is suggested. CONCLUSION:  Posterior dislocation of the right femoral prosthesis.    Dictated by: Marina Lorenzo MD on 5/07/2020 at 4:24 P

## 2020-05-07 NOTE — ED INITIAL ASSESSMENT (HPI)
Per patient, was bent down pulling weeds and stood up, hearing several pops in her right hip. Feels pain there and believes it is dislocated. This has happened to her before. AAO x 4. Patient was given 66 mcg of Fentanyl en route.      Denies numbness o

## 2020-05-08 NOTE — ED PROVIDER NOTES
The patient required sedation for right prosthetic hip dislocation. The risks, benefits, and alternatives were discussed with the patient and/or the family who consented.   The patient had a screening history and exam completed and there are no contraindic

## 2020-05-12 NOTE — H&P (VIEW-ONLY)
HISTORY AND PHYSICAL EXAMINATION    DATE: 05/12/2020  [facility] ORTHO     HISTORY OF PRESENT ILLNESS:  This is a 27-year-old female who presents with a chief complaint of recurrent dislocations of her right hip replacement.  She underwent a right hip replaceme her history of lumbar spine surgery, her back is probably stiff contributing to increased stress and strain on the hip when she bends. I asked her to continue with hip precautions.  She wishes to have more definitive treatment and at her request, we schedul oil.  17. Biotin. 18. Magnesium. 19. Flonase. 20. Saline mist.  21. Multivitamin. 22. Fexofenadine. 23. Pectin with acidophilus. ALLERGIES:  DUST (RASH) & ADALIMUMAB. SOCIAL HISTORY:  The patient is a former smoker. She quit in 1971.  She smoke

## 2020-05-13 RX ORDER — ACETAMINOPHEN, ASPIRIN AND CAFFEINE 250; 250; 65 MG/1; MG/1; MG/1
1 TABLET, FILM COATED ORAL EVERY 6 HOURS PRN
Status: ON HOLD | COMMUNITY
End: 2022-01-12

## 2020-05-14 NOTE — H&P
[facility]    PATIENT'S NAME: Masha Aguilar   ATTENDING PHYSICIAN: Smith Foy M.D.    PATIENT ACCOUNT#:   [de-identified]    LOCATION:    MEDICAL RECORD #:   [id number]       YOB: 1950  ADMISSION DATE:       05/22/2020    HISTORY AND PHYS fears recurrent dislocation. Her left hip, which I did in 2018, is doing quite well. She has no complaints regarding her left hip, but feels popping in her right hip and a sense of instability.   I told the patient with her history of lumbar spine surgery albuterol inhaler, Xanax, ramipril, tramadol, trazodone, prednisone, eszopiclone, leflunomide, meclizine, Prilosec, Enbrel, Zofran, fish oil, biotin, magnesium, Flonase, saline mist, multivitamin, fexofenadine, and pectin acidophilus.     ALLERGIES:  Dust;

## 2020-05-19 ENCOUNTER — LAB ENCOUNTER (OUTPATIENT)
Dept: LAB | Facility: HOSPITAL | Age: 70
DRG: 468 | End: 2020-05-19
Attending: ORTHOPAEDIC SURGERY
Payer: MEDICARE

## 2020-05-19 DIAGNOSIS — S73.004D HIP DISLOCATION, RIGHT, SUBSEQUENT ENCOUNTER: ICD-10-CM

## 2020-05-21 ENCOUNTER — ANESTHESIA EVENT (OUTPATIENT)
Dept: SURGERY | Facility: HOSPITAL | Age: 70
DRG: 468 | End: 2020-05-21
Payer: MEDICARE

## 2020-05-22 ENCOUNTER — APPOINTMENT (OUTPATIENT)
Dept: GENERAL RADIOLOGY | Facility: HOSPITAL | Age: 70
DRG: 468 | End: 2020-05-22
Attending: ORTHOPAEDIC SURGERY
Payer: MEDICARE

## 2020-05-22 ENCOUNTER — ANESTHESIA (OUTPATIENT)
Dept: SURGERY | Facility: HOSPITAL | Age: 70
DRG: 468 | End: 2020-05-22
Payer: MEDICARE

## 2020-05-22 ENCOUNTER — HOSPITAL ENCOUNTER (INPATIENT)
Facility: HOSPITAL | Age: 70
LOS: 1 days | Discharge: HOME HEALTH | DRG: 468 | End: 2020-05-22
Attending: ORTHOPAEDIC SURGERY | Admitting: ORTHOPAEDIC SURGERY
Payer: MEDICARE

## 2020-05-22 VITALS
WEIGHT: 203.25 LBS | RESPIRATION RATE: 16 BRPM | BODY MASS INDEX: 30.1 KG/M2 | SYSTOLIC BLOOD PRESSURE: 112 MMHG | TEMPERATURE: 98 F | HEIGHT: 69 IN | DIASTOLIC BLOOD PRESSURE: 63 MMHG | HEART RATE: 79 BPM | OXYGEN SATURATION: 94 %

## 2020-05-22 DIAGNOSIS — S73.004D HIP DISLOCATION, RIGHT, SUBSEQUENT ENCOUNTER: Primary | ICD-10-CM

## 2020-05-22 PROBLEM — Z01.818 PREOP TESTING: Status: RESOLVED | Noted: 2020-05-22 | Resolved: 2020-05-22

## 2020-05-22 PROBLEM — Z01.818 PREOP TESTING: Status: ACTIVE | Noted: 2020-05-22

## 2020-05-22 PROCEDURE — 87070 CULTURE OTHR SPECIMN AEROBIC: CPT | Performed by: ORTHOPAEDIC SURGERY

## 2020-05-22 PROCEDURE — 86901 BLOOD TYPING SEROLOGIC RH(D): CPT

## 2020-05-22 PROCEDURE — 87176 TISSUE HOMOGENIZATION CULTR: CPT | Performed by: ORTHOPAEDIC SURGERY

## 2020-05-22 PROCEDURE — 0SP90JZ REMOVAL OF SYNTHETIC SUBSTITUTE FROM RIGHT HIP JOINT, OPEN APPROACH: ICD-10-PCS | Performed by: ORTHOPAEDIC SURGERY

## 2020-05-22 PROCEDURE — 89060 EXAM SYNOVIAL FLUID CRYSTALS: CPT | Performed by: ORTHOPAEDIC SURGERY

## 2020-05-22 PROCEDURE — 97165 OT EVAL LOW COMPLEX 30 MIN: CPT

## 2020-05-22 PROCEDURE — 86900 BLOOD TYPING SEROLOGIC ABO: CPT

## 2020-05-22 PROCEDURE — 97535 SELF CARE MNGMENT TRAINING: CPT

## 2020-05-22 PROCEDURE — 0SR90JZ REPLACEMENT OF RIGHT HIP JOINT WITH SYNTHETIC SUBSTITUTE, OPEN APPROACH: ICD-10-PCS | Performed by: ORTHOPAEDIC SURGERY

## 2020-05-22 PROCEDURE — 87205 SMEAR GRAM STAIN: CPT | Performed by: ORTHOPAEDIC SURGERY

## 2020-05-22 PROCEDURE — 97161 PT EVAL LOW COMPLEX 20 MIN: CPT

## 2020-05-22 PROCEDURE — 87075 CULTR BACTERIA EXCEPT BLOOD: CPT | Performed by: ORTHOPAEDIC SURGERY

## 2020-05-22 PROCEDURE — 89051 BODY FLUID CELL COUNT: CPT | Performed by: ORTHOPAEDIC SURGERY

## 2020-05-22 PROCEDURE — 3E0T3BZ INTRODUCTION OF ANESTHETIC AGENT INTO PERIPHERAL NERVES AND PLEXI, PERCUTANEOUS APPROACH: ICD-10-PCS | Performed by: ANESTHESIOLOGY

## 2020-05-22 PROCEDURE — 89050 BODY FLUID CELL COUNT: CPT | Performed by: ORTHOPAEDIC SURGERY

## 2020-05-22 PROCEDURE — 73501 X-RAY EXAM HIP UNI 1 VIEW: CPT | Performed by: ORTHOPAEDIC SURGERY

## 2020-05-22 PROCEDURE — 86850 RBC ANTIBODY SCREEN: CPT

## 2020-05-22 PROCEDURE — 97116 GAIT TRAINING THERAPY: CPT

## 2020-05-22 DEVICE — BIOLOX® OPTION HD/ADPT, 12/14, 28 X +3.5
Type: IMPLANTABLE DEVICE | Site: HIP | Status: FUNCTIONAL
Brand: BIOLOX® OPTION

## 2020-05-22 DEVICE — TRIL LNG CONS LNR 50/52/54X28: Type: IMPLANTABLE DEVICE | Site: HIP | Status: FUNCTIONAL

## 2020-05-22 RX ORDER — METOCLOPRAMIDE HYDROCHLORIDE 5 MG/ML
10 INJECTION INTRAMUSCULAR; INTRAVENOUS AS NEEDED
Status: DISCONTINUED | OUTPATIENT
Start: 2020-05-22 | End: 2020-05-22

## 2020-05-22 RX ORDER — ACETAMINOPHEN 500 MG
500 TABLET ORAL EVERY 6 HOURS PRN
COMMUNITY
End: 2020-08-05

## 2020-05-22 RX ORDER — HYDROMORPHONE HYDROCHLORIDE 1 MG/ML
0.8 INJECTION, SOLUTION INTRAMUSCULAR; INTRAVENOUS; SUBCUTANEOUS EVERY 2 HOUR PRN
Status: DISCONTINUED | OUTPATIENT
Start: 2020-05-22 | End: 2020-05-22

## 2020-05-22 RX ORDER — METOCLOPRAMIDE HYDROCHLORIDE 5 MG/ML
INJECTION INTRAMUSCULAR; INTRAVENOUS AS NEEDED
Status: DISCONTINUED | OUTPATIENT
Start: 2020-05-22 | End: 2020-05-22 | Stop reason: SURG

## 2020-05-22 RX ORDER — CEFAZOLIN SODIUM/WATER 2 G/20 ML
2 SYRINGE (ML) INTRAVENOUS ONCE
Status: COMPLETED | OUTPATIENT
Start: 2020-05-22 | End: 2020-05-22

## 2020-05-22 RX ORDER — DEXAMETHASONE SODIUM PHOSPHATE 4 MG/ML
VIAL (ML) INJECTION AS NEEDED
Status: DISCONTINUED | OUTPATIENT
Start: 2020-05-22 | End: 2020-05-22 | Stop reason: SURG

## 2020-05-22 RX ORDER — MIDAZOLAM HYDROCHLORIDE 1 MG/ML
INJECTION INTRAMUSCULAR; INTRAVENOUS AS NEEDED
Status: DISCONTINUED | OUTPATIENT
Start: 2020-05-22 | End: 2020-05-22 | Stop reason: SURG

## 2020-05-22 RX ORDER — NALOXONE HYDROCHLORIDE 0.4 MG/ML
80 INJECTION, SOLUTION INTRAMUSCULAR; INTRAVENOUS; SUBCUTANEOUS AS NEEDED
Status: DISCONTINUED | OUTPATIENT
Start: 2020-05-22 | End: 2020-05-22

## 2020-05-22 RX ORDER — TRAMADOL HYDROCHLORIDE 50 MG/1
50 TABLET ORAL EVERY 12 HOURS
Status: DISCONTINUED | OUTPATIENT
Start: 2020-05-22 | End: 2020-05-22

## 2020-05-22 RX ORDER — ONDANSETRON 2 MG/ML
4 INJECTION INTRAMUSCULAR; INTRAVENOUS AS NEEDED
Status: DISCONTINUED | OUTPATIENT
Start: 2020-05-22 | End: 2020-05-22

## 2020-05-22 RX ORDER — HYDROCODONE BITARTRATE AND ACETAMINOPHEN 5; 325 MG/1; MG/1
2 TABLET ORAL AS NEEDED
Status: DISCONTINUED | OUTPATIENT
Start: 2020-05-22 | End: 2020-05-22

## 2020-05-22 RX ORDER — ACETAMINOPHEN 500 MG
1000 TABLET ORAL 4 TIMES DAILY
Status: DISCONTINUED | OUTPATIENT
Start: 2020-05-22 | End: 2020-05-22

## 2020-05-22 RX ORDER — BUPIVACAINE HYDROCHLORIDE 7.5 MG/ML
INJECTION, SOLUTION INTRASPINAL AS NEEDED
Status: DISCONTINUED | OUTPATIENT
Start: 2020-05-22 | End: 2020-05-22 | Stop reason: SURG

## 2020-05-22 RX ORDER — HYDROMORPHONE HYDROCHLORIDE 1 MG/ML
0.4 INJECTION, SOLUTION INTRAMUSCULAR; INTRAVENOUS; SUBCUTANEOUS EVERY 5 MIN PRN
Status: DISCONTINUED | OUTPATIENT
Start: 2020-05-22 | End: 2020-05-22

## 2020-05-22 RX ORDER — OXYCODONE HYDROCHLORIDE 5 MG/1
5 TABLET ORAL EVERY 4 HOURS PRN
Status: DISCONTINUED | OUTPATIENT
Start: 2020-05-22 | End: 2020-05-22

## 2020-05-22 RX ORDER — ACETAMINOPHEN 500 MG
1000 TABLET ORAL ONCE
Status: DISCONTINUED | OUTPATIENT
Start: 2020-05-22 | End: 2020-05-22 | Stop reason: HOSPADM

## 2020-05-22 RX ORDER — OXYCODONE HYDROCHLORIDE 5 MG/1
15 TABLET ORAL EVERY 4 HOURS PRN
Status: DISCONTINUED | OUTPATIENT
Start: 2020-05-22 | End: 2020-05-22

## 2020-05-22 RX ORDER — ONDANSETRON 2 MG/ML
INJECTION INTRAMUSCULAR; INTRAVENOUS AS NEEDED
Status: DISCONTINUED | OUTPATIENT
Start: 2020-05-22 | End: 2020-05-22 | Stop reason: SURG

## 2020-05-22 RX ORDER — LABETALOL HYDROCHLORIDE 5 MG/ML
5 INJECTION, SOLUTION INTRAVENOUS EVERY 5 MIN PRN
Status: DISCONTINUED | OUTPATIENT
Start: 2020-05-22 | End: 2020-05-22

## 2020-05-22 RX ORDER — OXYCODONE HYDROCHLORIDE 5 MG/1
10 TABLET ORAL EVERY 4 HOURS PRN
Status: DISCONTINUED | OUTPATIENT
Start: 2020-05-22 | End: 2020-05-22

## 2020-05-22 RX ORDER — TIZANIDINE 2 MG/1
2 TABLET ORAL 3 TIMES DAILY PRN
Status: DISCONTINUED | OUTPATIENT
Start: 2020-05-22 | End: 2020-05-22

## 2020-05-22 RX ORDER — DIPHENHYDRAMINE HYDROCHLORIDE 50 MG/ML
12.5 INJECTION INTRAMUSCULAR; INTRAVENOUS AS NEEDED
Status: DISCONTINUED | OUTPATIENT
Start: 2020-05-22 | End: 2020-05-22

## 2020-05-22 RX ORDER — HYDROMORPHONE HYDROCHLORIDE 1 MG/ML
0.4 INJECTION, SOLUTION INTRAMUSCULAR; INTRAVENOUS; SUBCUTANEOUS EVERY 2 HOUR PRN
Status: DISCONTINUED | OUTPATIENT
Start: 2020-05-22 | End: 2020-05-22

## 2020-05-22 RX ORDER — HYDROCODONE BITARTRATE AND ACETAMINOPHEN 5; 325 MG/1; MG/1
1 TABLET ORAL AS NEEDED
Status: DISCONTINUED | OUTPATIENT
Start: 2020-05-22 | End: 2020-05-22

## 2020-05-22 RX ORDER — SODIUM CHLORIDE, SODIUM LACTATE, POTASSIUM CHLORIDE, CALCIUM CHLORIDE 600; 310; 30; 20 MG/100ML; MG/100ML; MG/100ML; MG/100ML
INJECTION, SOLUTION INTRAVENOUS CONTINUOUS
Status: DISCONTINUED | OUTPATIENT
Start: 2020-05-22 | End: 2020-05-22

## 2020-05-22 RX ORDER — HYDROCODONE BITARTRATE AND ACETAMINOPHEN 5; 325 MG/1; MG/1
1 TABLET ORAL EVERY 4 HOURS PRN
Qty: 40 TABLET | Refills: 0 | Status: SHIPPED | OUTPATIENT
Start: 2020-05-22 | End: 2020-08-05

## 2020-05-22 RX ORDER — HYDROMORPHONE HYDROCHLORIDE 1 MG/ML
0.2 INJECTION, SOLUTION INTRAMUSCULAR; INTRAVENOUS; SUBCUTANEOUS EVERY 2 HOUR PRN
Status: DISCONTINUED | OUTPATIENT
Start: 2020-05-22 | End: 2020-05-22

## 2020-05-22 RX ADMIN — ONDANSETRON 4 MG: 2 INJECTION INTRAMUSCULAR; INTRAVENOUS at 07:05:00

## 2020-05-22 RX ADMIN — SODIUM CHLORIDE, SODIUM LACTATE, POTASSIUM CHLORIDE, CALCIUM CHLORIDE: 600; 310; 30; 20 INJECTION, SOLUTION INTRAVENOUS at 07:40:00

## 2020-05-22 RX ADMIN — DEXAMETHASONE SODIUM PHOSPHATE 4 MG: 4 MG/ML VIAL (ML) INJECTION at 07:05:00

## 2020-05-22 RX ADMIN — METOCLOPRAMIDE HYDROCHLORIDE 10 MG: 5 INJECTION INTRAMUSCULAR; INTRAVENOUS at 07:05:00

## 2020-05-22 RX ADMIN — BUPIVACAINE HYDROCHLORIDE 1.8 ML: 7.5 INJECTION, SOLUTION INTRASPINAL at 07:10:00

## 2020-05-22 RX ADMIN — SODIUM CHLORIDE, SODIUM LACTATE, POTASSIUM CHLORIDE, CALCIUM CHLORIDE: 600; 310; 30; 20 INJECTION, SOLUTION INTRAVENOUS at 08:43:00

## 2020-05-22 RX ADMIN — CEFAZOLIN SODIUM/WATER 2 G: 2 G/20 ML SYRINGE (ML) INTRAVENOUS at 07:23:00

## 2020-05-22 RX ADMIN — MIDAZOLAM HYDROCHLORIDE 2 MG: 1 INJECTION INTRAMUSCULAR; INTRAVENOUS at 07:05:00

## 2020-05-22 NOTE — ANESTHESIA PROCEDURE NOTES
Regional Block  Performed by: Geraldo Khan MD  Authorized by: Geraldo Khan MD       General Information and Staff    Start Time:  5/22/2020 7:10 AM  End Time:  5/22/2020 7:14 AM  Anesthesiologist:  Geraldo Khan MD  Patient Location:  OR

## 2020-05-22 NOTE — INTERVAL H&P NOTE
Pre-op Diagnosis: Hip dislocation, right, subsequent encounter [S73.004D]    The above referenced H&P was reviewed by Viktoria Huitron MD on 5/22/2020, the patient was examined and no significant changes have occurred in the patient's condition since the H&P

## 2020-05-22 NOTE — INTERVAL H&P NOTE
Pre-op Diagnosis: Hip dislocation, right, subsequent encounter [S73.004D]    The above referenced H&P was reviewed by Neva Adhikari MD on 5/22/2020, the patient was examined and no significant changes have occurred in the patient's condition since the H&P

## 2020-05-22 NOTE — ANESTHESIA POSTPROCEDURE EVALUATION
[address] [facility] Patient Status:  Hospital Outpatient Surgery   Age/Gender 79year old female MRN [id number]   Location [address] Attending Radha Menjivar MD   Hosp Day # 0 PCP Radha Barnett, DO       Anesthes

## 2020-05-22 NOTE — ANESTHESIA PROCEDURE NOTES
Spinal Block  Performed by: Giselle Morales MD  Authorized by: Giselle Morales MD       General Information and Staff    Start Time:  5/22/2020 7:03 AM  End Time:  5/22/2020 7:10 AM  Anesthesiologist:  Giselle Morales MD  Patient Location:  OR  Site

## 2020-05-22 NOTE — OCCUPATIONAL THERAPY NOTE
OCCUPATIONAL THERAPY QUICK EVALUATION - INPATIENT    Room Number: 2052  Evaluation Date: 5/22/2020     Type of Evaluation: Quick Eval  Presenting Problem: s/p R hip revision of acetabular component to capture cup 5/22/20    Physician Order: IP Consult to O ESOPHAGOGASTRODUODENOSCOPY, POSSIBLE BIOPSY, POSSIBLE POLYPECTOMY 68452 N/A 1/10/2014    Performed by Alvaro Gray MD at [facility]     • HIP INJECTION Right 5/25/2016    Performed by Corinne Salazar DO at [place] [address] PAIN ASSESSMENT  Ratin  Location: R hip  Management Techniques: Activity promotion; Body mechanics;Breathing techniques;Relaxation;Repositioning    COGNITION  WFL    RANGE OF MOTION AND STRENGTH ASSESSMENT  Upper extremity ROM is within functional l standing; UB dressing Mod I; education on toileting and toilet transfer techniques, performed simulated transfer with standby assist to raised height toilet with use of grab bar required (reports has RTS with arms in same location at home), simulated toile admission. Patient was able to achieve the following goals:  Patient able to toilet transfer: At supervision level or above; patient reports will have supervision at home  Patient able to dress lower extremities:  At supervision level or above; patient r

## 2020-05-22 NOTE — ANESTHESIA PREPROCEDURE EVALUATION
PRE-OP EVALUATION    Patient Name: [facility]    Pre-op Diagnosis: Hip dislocation, right, subsequent encounter [H22.571H]    Procedure(s):  REVISION OF ACETABULAR COMPONENT TO A CAPTURE CUP RIGHT HIP    Surgeon(s) and Role:     Nathalie Reilly MD - Pr by mouth daily. , Disp: , Rfl:   Fexofenadine HCl 180 MG Oral Tab, Take 180 mg by mouth daily. , Disp: , Rfl:         Allergies: Dust; Humira      Anesthesia Evaluation    Patient summary reviewed.     Anesthetic Complications  (-) history of anesthetic compl MANAGEMENT   • TRANSFORAMINAL EPIDURAL - LUMBAR Left 3/16/2015    Performed by Cherylene Deters, MD at [address] [facility]  1/10/14, 2019     Social History    Tobacco Use      Smoking status: Former Smoker dental history. Pulmonary    Pulmonary exam normal.  Breath sounds clear to auscultation bilaterally. Other findings            ASA: 2   Plan: regional and spinal  NPO status verified and patient meets guidelines.   Patient has not melodie

## 2020-05-22 NOTE — BRIEF OP NOTE
Pre-Operative Diagnosis: Hip dislocation, right, subsequent encounter [S73.004D]     Post-Operative Diagnosis: Hip dislocation, right, subsequent encounter [S73.004D]      Procedure Performed:   Procedure(s):  REVISION OF ACETABULAR COMPONENT TO A CAPTURE

## 2020-05-22 NOTE — PHYSICAL THERAPY NOTE
PHYSICAL THERAPY QUICK EVALUATION - INPATIENT    Room Number: [facility] PRE ASCC/ PRE ASCC Pool  Evaluation Date: 5/22/2020  Presenting Problem: s/p right revision acetabluar convert to capture cup 5/22/20  Physician Order: PT Eval and Treat    Problem List [facility]   • TRANSFORAMINAL EPIDURAL - LUMBAR N/A 4/1/2015    Performed by Marsha Riley MD at [facility]   • TRANSFORAMINAL EPIDURAL - LUMBAR Left 3/16/2015    Performed by Marsha Riley MD at [address] [facility] from a bed to a chair (including a wheelchair)?: None   -   Need to walk in hospital room?: A Little   -   Climbing 3-5 steps with a railing?: A Little       AM-PAC Score:  Raw Score: 22   Approx Degree of Impairment: 20.91%   Standardized Score (AM-PAC Sc dislocations. Pertinent comorbidities and personal factors impacting therapy include right and left DARRICK with multiple right DARRICK dislocations, htn, spine surgeries, RA.   Pt seen by physical therapy for instructions in therex, ROM, bed mobility, gait traini

## 2020-05-22 NOTE — OR NURSING
Patient given Incentive Spirometer. Instructed on proper use, patient instructed to use 10 times per hour while awake. Patient verbalized understanding and able to demonstrate proper use of Incentive Spirometer reaching 1000.

## 2020-05-22 NOTE — CM/SW NOTE
Contacted by RN in [facility] for pt s/p hip revision with Dr Boom Apple for DC planning needs. Spoke with Idalia Mclaughlin from PT who worked with pt and recommends [facility]. Per chart review, pt with history of [facility].   Spoke with Sarah Mccain from [facility] who will meet with

## 2020-05-26 NOTE — OPERATIVE REPORT
[address]    PATIENT'S NAME: Saadia Torre   ATTENDING PHYSICIAN: Alphonso Finley M.D. OPERATING PHYSICIAN: Alphonso Finley M.D.    PATIENT ACCOUNT#:   [de-identified]    LOCATION:  [address] [place] [address] 14 [address] 10  MEDICAL RECORD #:   [id number]       DATE trochanter. The hip was dislocatable by flexing, adducting, and then internally rotating. With the hip in full flexion in adduction, it could be dislocated posteriorly. The ceramic head was removed from the trunnion of the femoral component.   I used a f fascia gluteus anthony were closed with #1 Ethibond suture. Deep tissues were closed with 0 and 2-0 Vicryl suture. Skin was closed with staples. An Aquacel dressing and a sterile dressing were applied. Patient was placed back on her bed.   Limb lengths

## 2020-05-28 PROBLEM — Z47.89 ORTHOPEDIC AFTERCARE: Status: ACTIVE | Noted: 2020-05-28

## 2020-06-18 PROBLEM — M16.11 OSTEOARTHRITIS OF RIGHT HIP: Status: ACTIVE | Noted: 2020-06-18

## 2020-10-06 ENCOUNTER — OFFICE VISIT (OUTPATIENT)
Dept: UROLOGY | Facility: HOSPITAL | Age: 70
End: 2020-10-06
Attending: OBSTETRICS & GYNECOLOGY
Payer: MEDICARE

## 2020-10-06 VITALS
WEIGHT: 198 LBS | SYSTOLIC BLOOD PRESSURE: 140 MMHG | BODY MASS INDEX: 29.33 KG/M2 | HEART RATE: 86 BPM | HEIGHT: 69 IN | DIASTOLIC BLOOD PRESSURE: 87 MMHG

## 2020-10-06 DIAGNOSIS — N39.3 FEMALE STRESS INCONTINENCE: Primary | ICD-10-CM

## 2020-10-06 DIAGNOSIS — N95.2 POSTMENOPAUSAL ATROPHIC VAGINITIS: ICD-10-CM

## 2020-10-06 DIAGNOSIS — N81.11 CYSTOCELE, MIDLINE: ICD-10-CM

## 2020-10-06 DIAGNOSIS — N81.84 PELVIC MUSCLE WASTING: ICD-10-CM

## 2020-10-06 DIAGNOSIS — R35.1 NOCTURIA: ICD-10-CM

## 2020-10-06 DIAGNOSIS — R33.9 INCOMPLETE BLADDER EMPTYING: ICD-10-CM

## 2020-10-06 DIAGNOSIS — N39.41 URGE INCONTINENCE: ICD-10-CM

## 2020-10-06 PROCEDURE — 87186 SC STD MICRODIL/AGAR DIL: CPT | Performed by: OBSTETRICS & GYNECOLOGY

## 2020-10-06 PROCEDURE — 87086 URINE CULTURE/COLONY COUNT: CPT | Performed by: OBSTETRICS & GYNECOLOGY

## 2020-10-06 PROCEDURE — 81002 URINALYSIS NONAUTO W/O SCOPE: CPT

## 2020-10-06 PROCEDURE — 87077 CULTURE AEROBIC IDENTIFY: CPT | Performed by: OBSTETRICS & GYNECOLOGY

## 2020-10-06 PROCEDURE — 99212 OFFICE O/P EST SF 10 MIN: CPT

## 2020-10-06 RX ORDER — NITROFURANTOIN 25; 75 MG/1; MG/1
100 CAPSULE ORAL 2 TIMES DAILY
Qty: 14 CAPSULE | Refills: 0 | Status: SHIPPED | OUTPATIENT
Start: 2020-10-06 | End: 2020-10-27 | Stop reason: ALTCHOICE

## 2020-10-06 NOTE — PROGRESS NOTES
Maury Flores DO  10/6/2020     Referred by Dr. Roseanna Rosales  Pt here with self    Patient presents with:  Prolapse: referred by Dr. Roseanna Rosales, x 10 years, not bothersome  Incontinence: UUI>MICHAEL, for about 1.5 years, worsening last few months      HPI:  +MICHAEL  + UUI Bilateral 06/2017    R 2017, L 2018   • HIP REPLACEMENT SURGERY  last 5/2020   • HIP TOTAL ARTHROPLASTY REVISION Right 5/22/2020    Performed by Lindsay Larios MD at [facility] MAIN OR   • HIP TOTAL REPLACEMENT Left 9/14/2018    Performed by Lindsay Larios MD at A DAY , Disp: 60 tablet, Rfl: 0    •  ramipril 10 MG Oral Cap, TAKE 2 CAPSULES BY MOUTH IN THE EVENING, Disp: 180 capsule, Rfl: 0    •  Omeprazole 40 MG Oral Capsule Delayed Release, Take 1 capsule (40 mg total) by mouth daily.  TAKE 1 CAPSULE BY MOUTH IN T HYDROcodone-acetaminophen 5-325 MG Oral Tab, Take 1 tablet by mouth every 4 (four) hours as needed for Pain.  (Patient not taking: Reported on 10/6/2020 ), Disp: 30 tablet, Rfl: 0    •  aspirin-acetaminophen-caffeine 884-317-73 MG Oral Tab, Take 1 tablet by ROUTINE    (N81.84) Pelvic muscle wasting    (N95.2) Postmenopausal atrophic vaginitis    (N81.11) Cystocele, midline    (R33.9) Incomplete bladder emptying      Discussion Items:   Urodynamics and cystoscopy for evaluation of LUTS  Behavioral and

## 2020-10-08 ENCOUNTER — TELEPHONE (OUTPATIENT)
Dept: UROLOGY | Facility: HOSPITAL | Age: 70
End: 2020-10-08

## 2020-10-08 NOTE — TELEPHONE ENCOUNTER
Patient to continue Macrobid, positive for infection. Pt verbalized an understanding and agrees w/ plan. All questions answered.

## 2020-10-14 ENCOUNTER — OFFICE VISIT (OUTPATIENT)
Dept: UROLOGY | Facility: HOSPITAL | Age: 70
End: 2020-10-14
Attending: OBSTETRICS & GYNECOLOGY
Payer: MEDICARE

## 2020-10-14 VITALS — HEIGHT: 69 IN | WEIGHT: 198 LBS | RESPIRATION RATE: 16 BRPM | BODY MASS INDEX: 29.33 KG/M2 | TEMPERATURE: 98 F

## 2020-10-14 DIAGNOSIS — N81.11 CYSTOCELE, MIDLINE: Primary | ICD-10-CM

## 2020-10-14 PROCEDURE — 51784 ANAL/URINARY MUSCLE STUDY: CPT

## 2020-10-14 PROCEDURE — 51729 CYSTOMETROGRAM W/VP&UP: CPT

## 2020-10-14 PROCEDURE — 57160 INSERT PESSARY/OTHER DEVICE: CPT

## 2020-10-14 PROCEDURE — 51741 ELECTRO-UROFLOWMETRY FIRST: CPT

## 2020-10-14 PROCEDURE — 51797 INTRAABDOMINAL PRESSURE TEST: CPT

## 2020-10-14 NOTE — PATIENT INSTRUCTIONS
[address]  [place] [address]  [facility]OSPITAL: [phone]  FAX: [phone]       Urodynamic Testing Discharge Instructions: There are NO dietary or activity restrictions.   You may resume

## 2020-10-14 NOTE — PROCEDURES
Patient here for urodynamic testing. Procedure explained and confirmed by patient. See evaluation form for results. Both verbal and written discharge instructions were given.   Patient tolerated procedure well and will follow up with Dr. Heather Whittington 3    •  leflunomide (ARAVA) 10 MG Oral Tab, One tab every day, Disp: 30 tablet, Rfl: 11    •  Meclizine HCl 12.5 MG Oral Tab, TAKE 1 TABLET(12.5 MG) BY MOUTH THREE TIMES DAILY AS NEEDED, Disp: 30 tablet, Rfl: 1    •  Omega-3 Fatty Acids (FISH OIL) 1000 MG Infusion:  Water Rate 50 mL/min  Temp:  Room  Position:  [x]  Sit  []  Stand  []  Supine  First sensation:   44 mL  First desire to void:   101 mL  Strong desire to void:  182 mL  Maximum cystometric capacity:   266 mL  Detrusor Activity:  []  Unstable 266cc  No DO  MICHAEL @ 200 reduced    Post-Procedure Diagnoses: Stress urinary incontinence [N39.3] and 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

## 2020-10-14 NOTE — PROGRESS NOTES
Patient left with # 3 ring - will trial device and can re assess upon UDS f/u. Bowel regime also emphasized  Pt verbalized an understanding and agrees w/ plan. All questions answered.

## 2020-10-27 ENCOUNTER — OFFICE VISIT (OUTPATIENT)
Dept: UROLOGY | Facility: HOSPITAL | Age: 70
End: 2020-10-27
Attending: OBSTETRICS & GYNECOLOGY
Payer: MEDICARE

## 2020-10-27 VITALS
SYSTOLIC BLOOD PRESSURE: 124 MMHG | HEIGHT: 69 IN | RESPIRATION RATE: 20 BRPM | BODY MASS INDEX: 29.33 KG/M2 | DIASTOLIC BLOOD PRESSURE: 46 MMHG | WEIGHT: 198 LBS

## 2020-10-27 DIAGNOSIS — N39.41 URGE INCONTINENCE: ICD-10-CM

## 2020-10-27 DIAGNOSIS — N81.11 CYSTOCELE, MIDLINE: Primary | ICD-10-CM

## 2020-10-27 DIAGNOSIS — R35.1 NOCTURIA: ICD-10-CM

## 2020-10-27 DIAGNOSIS — N81.6 RECTOCELE: ICD-10-CM

## 2020-10-27 DIAGNOSIS — N39.3 FEMALE STRESS INCONTINENCE: ICD-10-CM

## 2020-10-27 PROCEDURE — 87077 CULTURE AEROBIC IDENTIFY: CPT | Performed by: OBSTETRICS & GYNECOLOGY

## 2020-10-27 PROCEDURE — 87086 URINE CULTURE/COLONY COUNT: CPT | Performed by: OBSTETRICS & GYNECOLOGY

## 2020-10-27 PROCEDURE — 87186 SC STD MICRODIL/AGAR DIL: CPT | Performed by: OBSTETRICS & GYNECOLOGY

## 2020-10-27 PROCEDURE — 81001 URINALYSIS AUTO W/SCOPE: CPT | Performed by: OBSTETRICS & GYNECOLOGY

## 2020-10-27 PROCEDURE — 99212 OFFICE O/P EST SF 10 MIN: CPT

## 2020-10-27 NOTE — PROGRESS NOTES
Pt presents w/ initial c/o UI  Urodynamic testing undergone without complication.   Results reviewed with patient  UDS  51/33cc & 210/95cc  prison 266cc  No DO  MICHAEL @ 200 reduced    Discussed with patient mgmt options for UUI, MICHAEL, bulge  Stopped oxybutynin -

## 2020-10-30 ENCOUNTER — TELEPHONE (OUTPATIENT)
Dept: UROLOGY | Facility: HOSPITAL | Age: 70
End: 2020-10-30

## 2020-10-30 RX ORDER — NITROFURANTOIN MACROCRYSTALS 100 MG/1
100 CAPSULE ORAL NIGHTLY
Qty: 90 CAPSULE | Refills: 0 | Status: SHIPPED | OUTPATIENT
Start: 2020-11-08 | End: 2021-02-26

## 2020-10-30 RX ORDER — SULFAMETHOXAZOLE AND TRIMETHOPRIM 800; 160 MG/1; MG/1
1 TABLET ORAL 2 TIMES DAILY
Qty: 20 TABLET | Refills: 0 | Status: SHIPPED | OUTPATIENT
Start: 2020-10-30 | End: 2020-11-16 | Stop reason: ALTCHOICE

## 2020-10-30 NOTE — TELEPHONE ENCOUNTER
Phoned pt w/ ucx results. TORB Dr Jasmyne harding DS po BID X 10days, then pt to start macrodantin 100mg po q hs through surgery 11/23/20. Pt is having no sx. Pt verbalized an understanding and agrees w/ plan. All questions answered.

## 2020-11-16 RX ORDER — ACETAMINOPHEN 500 MG
1000 TABLET ORAL ONCE
Status: CANCELLED | OUTPATIENT
Start: 2020-11-16 | End: 2020-11-16

## 2020-11-17 ENCOUNTER — LABORATORY ENCOUNTER (OUTPATIENT)
Dept: LAB | Age: 70
End: 2020-11-17
Payer: MEDICARE

## 2020-11-17 DIAGNOSIS — Z01.818 PRE-OP TESTING: ICD-10-CM

## 2020-11-17 PROCEDURE — 80048 BASIC METABOLIC PNL TOTAL CA: CPT

## 2020-11-17 PROCEDURE — 36415 COLL VENOUS BLD VENIPUNCTURE: CPT

## 2020-11-21 ENCOUNTER — APPOINTMENT (OUTPATIENT)
Dept: LAB | Age: 70
End: 2020-11-21
Attending: OBSTETRICS & GYNECOLOGY
Payer: MEDICARE

## 2020-11-21 DIAGNOSIS — Z01.818 PRE-OP TESTING: ICD-10-CM

## 2020-11-22 ENCOUNTER — ANESTHESIA EVENT (OUTPATIENT)
Dept: SURGERY | Facility: HOSPITAL | Age: 70
End: 2020-11-22
Payer: MEDICARE

## 2020-11-23 ENCOUNTER — ANESTHESIA (OUTPATIENT)
Dept: SURGERY | Facility: HOSPITAL | Age: 70
End: 2020-11-23
Payer: MEDICARE

## 2020-11-23 ENCOUNTER — HOSPITAL ENCOUNTER (OUTPATIENT)
Facility: HOSPITAL | Age: 70
Setting detail: HOSPITAL OUTPATIENT SURGERY
Discharge: HOME | End: 2020-11-23
Attending: OBSTETRICS & GYNECOLOGY | Admitting: OBSTETRICS & GYNECOLOGY
Payer: MEDICARE

## 2020-11-23 VITALS
WEIGHT: 205.25 LBS | BODY MASS INDEX: 30.4 KG/M2 | TEMPERATURE: 98 F | DIASTOLIC BLOOD PRESSURE: 66 MMHG | SYSTOLIC BLOOD PRESSURE: 142 MMHG | RESPIRATION RATE: 16 BRPM | HEART RATE: 72 BPM | OXYGEN SATURATION: 97 % | HEIGHT: 69 IN

## 2020-11-23 DIAGNOSIS — Z01.818 PRE-OP TESTING: Primary | ICD-10-CM

## 2020-11-23 PROCEDURE — 0TSB0ZZ REPOSITION BLADDER, OPEN APPROACH: ICD-10-PCS | Performed by: OBSTETRICS & GYNECOLOGY

## 2020-11-23 PROCEDURE — 0JQC0ZZ REPAIR PELVIC REGION SUBCUTANEOUS TISSUE AND FASCIA, OPEN APPROACH: ICD-10-PCS | Performed by: OBSTETRICS & GYNECOLOGY

## 2020-11-23 DEVICE — TRANSVAGINAL MID-URETHRAL SLING
Type: IMPLANTABLE DEVICE | Site: VAGINA | Status: FUNCTIONAL
Brand: ADVANTAGE FIT™ BLUE SYSTEM

## 2020-11-23 RX ORDER — ONDANSETRON 2 MG/ML
4 INJECTION INTRAMUSCULAR; INTRAVENOUS AS NEEDED
Status: DISCONTINUED | OUTPATIENT
Start: 2020-11-23 | End: 2020-11-23

## 2020-11-23 RX ORDER — HYDROCODONE BITARTRATE AND ACETAMINOPHEN 5; 325 MG/1; MG/1
2 TABLET ORAL AS NEEDED
Status: DISCONTINUED | OUTPATIENT
Start: 2020-11-23 | End: 2020-11-23

## 2020-11-23 RX ORDER — GLYCOPYRROLATE 0.2 MG/ML
INJECTION, SOLUTION INTRAMUSCULAR; INTRAVENOUS AS NEEDED
Status: DISCONTINUED | OUTPATIENT
Start: 2020-11-23 | End: 2020-11-23 | Stop reason: SURG

## 2020-11-23 RX ORDER — MIDAZOLAM HYDROCHLORIDE 1 MG/ML
1 INJECTION INTRAMUSCULAR; INTRAVENOUS EVERY 5 MIN PRN
Status: DISCONTINUED | OUTPATIENT
Start: 2020-11-23 | End: 2020-11-23

## 2020-11-23 RX ORDER — KETOROLAC TROMETHAMINE 30 MG/ML
15 INJECTION, SOLUTION INTRAMUSCULAR; INTRAVENOUS ONCE
Status: COMPLETED | OUTPATIENT
Start: 2020-11-23 | End: 2020-11-23

## 2020-11-23 RX ORDER — HYDROMORPHONE HYDROCHLORIDE 1 MG/ML
0.4 INJECTION, SOLUTION INTRAMUSCULAR; INTRAVENOUS; SUBCUTANEOUS EVERY 5 MIN PRN
Status: DISCONTINUED | OUTPATIENT
Start: 2020-11-23 | End: 2020-11-23

## 2020-11-23 RX ORDER — DEXAMETHASONE SODIUM PHOSPHATE 4 MG/ML
VIAL (ML) INJECTION AS NEEDED
Status: DISCONTINUED | OUTPATIENT
Start: 2020-11-23 | End: 2020-11-23 | Stop reason: SURG

## 2020-11-23 RX ORDER — IBUPROFEN 600 MG/1
600 TABLET ORAL EVERY 8 HOURS PRN
Qty: 30 TABLET | Refills: 3 | Status: SHIPPED | OUTPATIENT
Start: 2020-11-23 | End: 2021-02-26

## 2020-11-23 RX ORDER — METOCLOPRAMIDE HYDROCHLORIDE 5 MG/ML
10 INJECTION INTRAMUSCULAR; INTRAVENOUS AS NEEDED
Status: DISCONTINUED | OUTPATIENT
Start: 2020-11-23 | End: 2020-11-23

## 2020-11-23 RX ORDER — BUPIVACAINE HYDROCHLORIDE AND EPINEPHRINE 2.5; 5 MG/ML; UG/ML
INJECTION, SOLUTION EPIDURAL; INFILTRATION; INTRACAUDAL; PERINEURAL AS NEEDED
Status: DISCONTINUED | OUTPATIENT
Start: 2020-11-23 | End: 2020-11-23 | Stop reason: HOSPADM

## 2020-11-23 RX ORDER — ACETAMINOPHEN 500 MG
1000 TABLET ORAL ONCE
Status: ON HOLD | COMMUNITY
End: 2020-11-23

## 2020-11-23 RX ORDER — ROCURONIUM BROMIDE 10 MG/ML
INJECTION, SOLUTION INTRAVENOUS AS NEEDED
Status: DISCONTINUED | OUTPATIENT
Start: 2020-11-23 | End: 2020-11-23 | Stop reason: SURG

## 2020-11-23 RX ORDER — NEOSTIGMINE METHYLSULFATE 1 MG/ML
INJECTION INTRAVENOUS AS NEEDED
Status: DISCONTINUED | OUTPATIENT
Start: 2020-11-23 | End: 2020-11-23 | Stop reason: SURG

## 2020-11-23 RX ORDER — NALOXONE HYDROCHLORIDE 0.4 MG/ML
80 INJECTION, SOLUTION INTRAMUSCULAR; INTRAVENOUS; SUBCUTANEOUS AS NEEDED
Status: DISCONTINUED | OUTPATIENT
Start: 2020-11-23 | End: 2020-11-23

## 2020-11-23 RX ORDER — CEFAZOLIN SODIUM/WATER 2 G/20 ML
2 SYRINGE (ML) INTRAVENOUS ONCE
Status: COMPLETED | OUTPATIENT
Start: 2020-11-23 | End: 2020-11-23

## 2020-11-23 RX ORDER — HYDROCODONE BITARTRATE AND ACETAMINOPHEN 5; 325 MG/1; MG/1
1 TABLET ORAL AS NEEDED
Status: DISCONTINUED | OUTPATIENT
Start: 2020-11-23 | End: 2020-11-23

## 2020-11-23 RX ORDER — SODIUM CHLORIDE, SODIUM LACTATE, POTASSIUM CHLORIDE, CALCIUM CHLORIDE 600; 310; 30; 20 MG/100ML; MG/100ML; MG/100ML; MG/100ML
INJECTION, SOLUTION INTRAVENOUS CONTINUOUS
Status: DISCONTINUED | OUTPATIENT
Start: 2020-11-23 | End: 2020-11-23

## 2020-11-23 RX ORDER — ONDANSETRON 2 MG/ML
INJECTION INTRAMUSCULAR; INTRAVENOUS AS NEEDED
Status: DISCONTINUED | OUTPATIENT
Start: 2020-11-23 | End: 2020-11-23 | Stop reason: SURG

## 2020-11-23 RX ORDER — HYDROCODONE BITARTRATE AND ACETAMINOPHEN 5; 325 MG/1; MG/1
1 TABLET ORAL EVERY 4 HOURS PRN
Qty: 20 TABLET | Refills: 0 | Status: SHIPPED | OUTPATIENT
Start: 2020-11-23 | End: 2020-12-08

## 2020-11-23 RX ORDER — LIDOCAINE HYDROCHLORIDE 10 MG/ML
INJECTION, SOLUTION EPIDURAL; INFILTRATION; INTRACAUDAL; PERINEURAL AS NEEDED
Status: DISCONTINUED | OUTPATIENT
Start: 2020-11-23 | End: 2020-11-23 | Stop reason: SURG

## 2020-11-23 RX ORDER — KETOROLAC TROMETHAMINE 30 MG/ML
INJECTION, SOLUTION INTRAMUSCULAR; INTRAVENOUS
Status: COMPLETED
Start: 2020-11-23 | End: 2020-11-23

## 2020-11-23 RX ADMIN — ONDANSETRON 4 MG: 2 INJECTION INTRAMUSCULAR; INTRAVENOUS at 11:36:00

## 2020-11-23 RX ADMIN — CEFAZOLIN SODIUM/WATER 2 G: 2 G/20 ML SYRINGE (ML) INTRAVENOUS at 11:08:00

## 2020-11-23 RX ADMIN — SODIUM CHLORIDE, SODIUM LACTATE, POTASSIUM CHLORIDE, CALCIUM CHLORIDE: 600; 310; 30; 20 INJECTION, SOLUTION INTRAVENOUS at 12:02:00

## 2020-11-23 RX ADMIN — GLYCOPYRROLATE 0.1 MG: 0.2 INJECTION, SOLUTION INTRAMUSCULAR; INTRAVENOUS at 11:50:00

## 2020-11-23 RX ADMIN — DEXAMETHASONE SODIUM PHOSPHATE 4 MG: 4 MG/ML VIAL (ML) INJECTION at 11:07:00

## 2020-11-23 RX ADMIN — LIDOCAINE HYDROCHLORIDE 50 MG: 10 INJECTION, SOLUTION EPIDURAL; INFILTRATION; INTRACAUDAL; PERINEURAL at 11:01:00

## 2020-11-23 RX ADMIN — ROCURONIUM BROMIDE 5 MG: 10 INJECTION, SOLUTION INTRAVENOUS at 11:01:00

## 2020-11-23 RX ADMIN — GLYCOPYRROLATE 0.2 MG: 0.2 INJECTION, SOLUTION INTRAMUSCULAR; INTRAVENOUS at 11:46:00

## 2020-11-23 RX ADMIN — SODIUM CHLORIDE, SODIUM LACTATE, POTASSIUM CHLORIDE, CALCIUM CHLORIDE: 600; 310; 30; 20 INJECTION, SOLUTION INTRAVENOUS at 10:56:00

## 2020-11-23 RX ADMIN — NEOSTIGMINE METHYLSULFATE 1 MG: 1 INJECTION INTRAVENOUS at 11:46:00

## 2020-11-23 RX ADMIN — ROCURONIUM BROMIDE 15 MG: 10 INJECTION, SOLUTION INTRAVENOUS at 11:12:00

## 2020-11-23 RX ADMIN — NEOSTIGMINE METHYLSULFATE 1 MG: 1 INJECTION INTRAVENOUS at 11:50:00

## 2020-11-23 NOTE — H&P
78 y/o female with cystocele, rectocele, MICHAEL with incomplete bladder emptying for surgical mgmt  Pre operative clearance with Dr Haven Armstrong, pt seen & examined without changes.   Thorough discussion of surgical risks, benefits, and alternatives including, but n

## 2020-11-23 NOTE — ANESTHESIA POSTPROCEDURE EVALUATION
[address] [facility] Patient Status:  Hospital Outpatient Surgery   Age/Gender 79year old female MRN [id number]   [facility] SURGERY Attending Denise Rushing, [address] Day # 0 PCP Jaskaran Corporal, DO       Anesthesia Post-op Note

## 2020-11-23 NOTE — ANESTHESIA PROCEDURE NOTES
Airway  Date/Time: 11/23/2020 11:03 AM  Urgency: elective    Airway not difficult    General Information and Staff    Patient location during procedure: OR  Anesthesiologist: Carmen Snellen Jun, MD  Performed: anesthesiologist     Indications and Patient

## 2020-11-23 NOTE — ANESTHESIA PREPROCEDURE EVALUATION
PRE-OP EVALUATION    Patient Name: Ayesha More    Pre-op Diagnosis: CYSTOCELE    Procedure(s):  ANTERIOR / POSTERIOR REPAIR, PLACEMENT OF MID-URETHRAL SLING, CYSTOSCOPY    Surgeon(s) and Role:     Praful Lee, DO - Primary    Pre-op vitals reviewed REPLACEMENT SURGERY  last 5/2020    right hip redone   • HIP TOTAL ARTHROPLASTY REVISION Right 5/22/2020    Performed by Hamida Uriostegui MD at [facility] MAIN OR   • HIP TOTAL REPLACEMENT Left 9/14/2018    Performed by Hamida Uriostegui MD at [address] [place] [address] history. Pulmonary    Pulmonary exam normal.  Breath sounds clear to auscultation bilaterally. Other findings            ASA: 3   Plan: general  NPO status verified and patient meets guidelines.   Patient has not taken beta blockers in

## 2020-11-23 NOTE — OPERATIVE REPORT
Pre Op: cystocele, rectocele, stress urinary incontinence  Post op: same    Procedure: anterior repair, posterior colpoperineorrhaphy, midurethral sling, cystoscopy    Surgeon: Katya Liu DO  Assist: GREGORY Collazo    EBL: 100cc   UOP 271DG     No complicatio Fit trocars were then used to place the guide sleeves into the retropubic space. Once the sleeves  were in position, cystoscopy was performed confirming there had been no injury to the bladder with placement of the trocars and sleeves.   In addition, urine

## 2020-11-24 ENCOUNTER — TELEPHONE (OUTPATIENT)
Dept: UROLOGY | Facility: HOSPITAL | Age: 70
End: 2020-11-24

## 2020-11-24 NOTE — TELEPHONE ENCOUNTER
TC to pt following surgery, A&P repair, MUS, cysto  Doing well, no complaints  Cath draining clear urine, waiting for BM  Pain controlled with PO meds (IBP & norco)  Reviewed bowel mgmt and activity restrictions  Tolerates ambulation  No heavy vaginal blee

## 2020-11-30 ENCOUNTER — OFFICE VISIT (OUTPATIENT)
Dept: UROLOGY | Facility: HOSPITAL | Age: 70
End: 2020-11-30
Attending: OBSTETRICS & GYNECOLOGY
Payer: MEDICARE

## 2020-11-30 VITALS — TEMPERATURE: 98 F | SYSTOLIC BLOOD PRESSURE: 142 MMHG | DIASTOLIC BLOOD PRESSURE: 74 MMHG | RESPIRATION RATE: 18 BRPM

## 2020-11-30 DIAGNOSIS — R33.9 URINARY RETENTION: Primary | ICD-10-CM

## 2020-11-30 PROCEDURE — 99212 OFFICE O/P EST SF 10 MIN: CPT

## 2020-11-30 NOTE — PATIENT INSTRUCTIONS
Voiding Trial Instructions  You have passed your voiding trial at 8:15 am.  Please make sure you are drinking some water today. You can take your Motrin to help with any swelling from the catheter.   It is important to try and empty your bladder every two

## 2020-11-30 NOTE — PROCEDURES
Patient here for voiding trial.  s/p 11/23/20 ANTERIOR / POSTERIOR REPAIR, PLACEMENT OF MID-URETHRAL SLING, CYSTOSCOPY/ patient reports that bowels are soft using Miralax/ metamucil prn. Using only Ibuprofen for pain. Feels tired due to walking down hallway

## 2020-12-08 ENCOUNTER — OFFICE VISIT (OUTPATIENT)
Dept: UROLOGY | Facility: HOSPITAL | Age: 70
End: 2020-12-08
Attending: OBSTETRICS & GYNECOLOGY
Payer: MEDICARE

## 2020-12-08 VITALS — TEMPERATURE: 98 F | BODY MASS INDEX: 30.36 KG/M2 | RESPIRATION RATE: 20 BRPM | WEIGHT: 205 LBS | HEIGHT: 69 IN

## 2020-12-08 DIAGNOSIS — Z98.890 POST-OPERATIVE STATE: Primary | ICD-10-CM

## 2020-12-08 DIAGNOSIS — N39.0 FREQUENT UTI: ICD-10-CM

## 2020-12-08 PROCEDURE — 81002 URINALYSIS NONAUTO W/O SCOPE: CPT

## 2020-12-08 PROCEDURE — 99212 OFFICE O/P EST SF 10 MIN: CPT

## 2020-12-08 PROCEDURE — 87086 URINE CULTURE/COLONY COUNT: CPT | Performed by: OBSTETRICS & GYNECOLOGY

## 2020-12-08 NOTE — PROGRESS NOTES
She is s/p Post-Op Summary  Procedure Date: 11/23/20  Procedure Name: Anterior and Posterior Repair;Mid-urethral Sling;Cystoscopy  Do you feel your surgery was successful?: Moderately successful  Compared to before surgery are you?: A little better  If you

## 2021-03-09 ENCOUNTER — OFFICE VISIT (OUTPATIENT)
Dept: UROLOGY | Facility: HOSPITAL | Age: 71
End: 2021-03-09
Attending: OBSTETRICS & GYNECOLOGY
Payer: MEDICARE

## 2021-03-09 VITALS — TEMPERATURE: 98 F | WEIGHT: 216 LBS | RESPIRATION RATE: 18 BRPM | HEIGHT: 69 IN | BODY MASS INDEX: 31.99 KG/M2

## 2021-03-09 DIAGNOSIS — N81.84 PELVIC MUSCLE WASTING: ICD-10-CM

## 2021-03-09 DIAGNOSIS — N95.2 POSTMENOPAUSAL ATROPHIC VAGINITIS: ICD-10-CM

## 2021-03-09 DIAGNOSIS — R35.1 NOCTURIA: Primary | ICD-10-CM

## 2021-03-09 DIAGNOSIS — Z98.890 POST-OPERATIVE STATE: ICD-10-CM

## 2021-03-09 LAB
BLOOD URINE: NEGATIVE
CONTROL RUN WITHIN 24 HOURS?: YES
LEUKOCYTE ESTERASE URINE: NEGATIVE
NITRITE URINE: NEGATIVE

## 2021-03-09 PROCEDURE — 99212 OFFICE O/P EST SF 10 MIN: CPT

## 2021-03-09 PROCEDURE — 87086 URINE CULTURE/COLONY COUNT: CPT | Performed by: OBSTETRICS & GYNECOLOGY

## 2021-03-09 RX ORDER — ESTRADIOL 0.1 MG/G
CREAM VAGINAL
Qty: 1 TUBE | Refills: 3 | Status: SHIPPED | OUTPATIENT
Start: 2021-03-09

## 2021-03-09 RX ORDER — PSYLLIUM HUSK (WITH SUGAR) 3.4 G/7 G
POWDER (GRAM) ORAL
Status: ON HOLD | COMMUNITY
End: 2022-01-12

## 2021-03-09 NOTE — PATIENT INSTRUCTIONS
[facility]  [facility]    Fingertip Application Method for Estrogen Vaginal Cream    1. Wash you hands with soap and water and dry thoroughly.     2.  Squeeze out enough cream from the tube to cover 1/2 of your index fin

## 2021-03-09 NOTE — PROGRESS NOTES
She is s/p Post-Op Summary  Procedure Date: 11/23/20  Procedure Name: Anterior Repair;Posterior Repair;Prolene Mesh Mid Urethral Sling;Cystoscopy  Post-Op Symptoms: MICHAEL;UUI  Do you feel your surgery was successful?: Moderately successful  Compared to befor

## 2021-03-25 PROBLEM — Z96.643 HISTORY OF BILATERAL HIP REPLACEMENTS: Status: RESOLVED | Noted: 2018-06-26 | Resolved: 2021-03-25

## 2021-03-25 PROBLEM — M16.11 OSTEOARTHRITIS OF RIGHT HIP: Status: RESOLVED | Noted: 2020-06-18 | Resolved: 2021-03-25

## 2021-03-25 PROBLEM — G47.30 SLEEP APNEA, UNSPECIFIED TYPE: Status: ACTIVE | Noted: 2021-03-25

## 2021-03-25 PROBLEM — Z47.89 ORTHOPEDIC AFTERCARE: Status: RESOLVED | Noted: 2020-05-28 | Resolved: 2021-03-25

## 2021-11-23 ENCOUNTER — OFFICE VISIT (OUTPATIENT)
Dept: UROLOGY | Facility: HOSPITAL | Age: 71
End: 2021-11-23
Attending: OBSTETRICS & GYNECOLOGY
Payer: MEDICARE

## 2021-11-23 VITALS — WEIGHT: 215 LBS | TEMPERATURE: 97 F | HEIGHT: 68 IN | BODY MASS INDEX: 32.58 KG/M2

## 2021-11-23 DIAGNOSIS — R35.1 NOCTURIA: Primary | ICD-10-CM

## 2021-11-23 DIAGNOSIS — N95.2 POSTMENOPAUSAL ATROPHIC VAGINITIS: ICD-10-CM

## 2021-11-23 DIAGNOSIS — Z98.890 POSTOPERATIVE STATE: ICD-10-CM

## 2021-11-23 DIAGNOSIS — N39.41 URGE URINARY INCONTINENCE: ICD-10-CM

## 2021-11-23 PROCEDURE — 99212 OFFICE O/P EST SF 10 MIN: CPT

## 2021-11-23 RX ORDER — MIRABEGRON 50 MG/1
50 TABLET, FILM COATED, EXTENDED RELEASE ORAL DAILY
Qty: 30 TABLET | Refills: 0 | Status: SHIPPED | OUTPATIENT
Start: 2021-11-23 | End: 2021-12-23

## 2021-12-10 ENCOUNTER — HOSPITAL ENCOUNTER (INPATIENT)
Facility: HOSPITAL | Age: 71
LOS: 1 days | Discharge: HOME | DRG: 638 | End: 2021-12-12
Attending: EMERGENCY MEDICINE | Admitting: INTERNAL MEDICINE
Payer: MEDICARE

## 2021-12-10 ENCOUNTER — APPOINTMENT (OUTPATIENT)
Dept: GENERAL RADIOLOGY | Facility: HOSPITAL | Age: 71
DRG: 638 | End: 2021-12-10
Attending: EMERGENCY MEDICINE
Payer: MEDICARE

## 2021-12-10 DIAGNOSIS — N39.0 URINARY TRACT INFECTION WITHOUT HEMATURIA, SITE UNSPECIFIED: ICD-10-CM

## 2021-12-10 DIAGNOSIS — E11.9 DIABETES MELLITUS, NEW ONSET (HCC): ICD-10-CM

## 2021-12-10 DIAGNOSIS — R73.9 HYPERGLYCEMIA: Primary | ICD-10-CM

## 2021-12-10 PROBLEM — E11.00 HYPEROSMOLAR HYPERGLYCEMIC STATE (HHS) (HCC): Status: ACTIVE | Noted: 2021-12-10

## 2021-12-10 PROBLEM — E11.65 HYPEROSMOLAR HYPERGLYCEMIC STATE (HHS) (HCC): Status: ACTIVE | Noted: 2021-12-10

## 2021-12-10 PROCEDURE — 71045 X-RAY EXAM CHEST 1 VIEW: CPT | Performed by: EMERGENCY MEDICINE

## 2021-12-10 PROCEDURE — 96361 HYDRATE IV INFUSION ADD-ON: CPT

## 2021-12-10 PROCEDURE — 93005 ELECTROCARDIOGRAM TRACING: CPT

## 2021-12-10 PROCEDURE — 99285 EMERGENCY DEPT VISIT HI MDM: CPT

## 2021-12-10 PROCEDURE — 85025 COMPLETE CBC W/AUTO DIFF WBC: CPT | Performed by: EMERGENCY MEDICINE

## 2021-12-10 PROCEDURE — 93010 ELECTROCARDIOGRAM REPORT: CPT

## 2021-12-10 PROCEDURE — 82803 BLOOD GASES ANY COMBINATION: CPT | Performed by: EMERGENCY MEDICINE

## 2021-12-10 PROCEDURE — 80053 COMPREHEN METABOLIC PANEL: CPT | Performed by: EMERGENCY MEDICINE

## 2021-12-10 PROCEDURE — 96365 THER/PROPH/DIAG IV INF INIT: CPT

## 2021-12-10 RX ORDER — SODIUM CHLORIDE 9 MG/ML
125 INJECTION, SOLUTION INTRAVENOUS CONTINUOUS
Status: DISCONTINUED | OUTPATIENT
Start: 2021-12-10 | End: 2021-12-11

## 2021-12-11 ENCOUNTER — APPOINTMENT (OUTPATIENT)
Dept: ULTRASOUND IMAGING | Facility: HOSPITAL | Age: 71
DRG: 638 | End: 2021-12-11
Attending: HOSPITALIST
Payer: MEDICARE

## 2021-12-11 PROBLEM — N39.0 URINARY TRACT INFECTION WITHOUT HEMATURIA, SITE UNSPECIFIED: Status: ACTIVE | Noted: 2021-12-11

## 2021-12-11 PROBLEM — E11.9 DIABETES MELLITUS, NEW ONSET (HCC): Status: ACTIVE | Noted: 2021-12-11

## 2021-12-11 PROBLEM — R73.9 HYPERGLYCEMIA: Status: ACTIVE | Noted: 2021-12-11

## 2021-12-11 PROCEDURE — 83735 ASSAY OF MAGNESIUM: CPT | Performed by: HOSPITALIST

## 2021-12-11 PROCEDURE — 87086 URINE CULTURE/COLONY COUNT: CPT | Performed by: EMERGENCY MEDICINE

## 2021-12-11 PROCEDURE — 87186 SC STD MICRODIL/AGAR DIL: CPT | Performed by: EMERGENCY MEDICINE

## 2021-12-11 PROCEDURE — 85027 COMPLETE CBC AUTOMATED: CPT | Performed by: HOSPITALIST

## 2021-12-11 PROCEDURE — 83036 HEMOGLOBIN GLYCOSYLATED A1C: CPT | Performed by: HOSPITALIST

## 2021-12-11 PROCEDURE — 82962 GLUCOSE BLOOD TEST: CPT

## 2021-12-11 PROCEDURE — 85025 COMPLETE CBC W/AUTO DIFF WBC: CPT | Performed by: HOSPITALIST

## 2021-12-11 PROCEDURE — 87077 CULTURE AEROBIC IDENTIFY: CPT | Performed by: EMERGENCY MEDICINE

## 2021-12-11 PROCEDURE — 80053 COMPREHEN METABOLIC PANEL: CPT | Performed by: HOSPITALIST

## 2021-12-11 PROCEDURE — 81001 URINALYSIS AUTO W/SCOPE: CPT | Performed by: EMERGENCY MEDICINE

## 2021-12-11 PROCEDURE — 82570 ASSAY OF URINE CREATININE: CPT | Performed by: HOSPITALIST

## 2021-12-11 PROCEDURE — 84156 ASSAY OF PROTEIN URINE: CPT | Performed by: HOSPITALIST

## 2021-12-11 PROCEDURE — 76770 US EXAM ABDO BACK WALL COMP: CPT | Performed by: HOSPITALIST

## 2021-12-11 PROCEDURE — 85007 BL SMEAR W/DIFF WBC COUNT: CPT | Performed by: HOSPITALIST

## 2021-12-11 PROCEDURE — 84300 ASSAY OF URINE SODIUM: CPT | Performed by: HOSPITALIST

## 2021-12-11 RX ORDER — PREGABALIN 50 MG/1
50 CAPSULE ORAL 3 TIMES DAILY
Status: DISCONTINUED | OUTPATIENT
Start: 2021-12-11 | End: 2021-12-12

## 2021-12-11 RX ORDER — HEPARIN SODIUM 5000 [USP'U]/ML
5000 INJECTION, SOLUTION INTRAVENOUS; SUBCUTANEOUS EVERY 8 HOURS SCHEDULED
Status: DISCONTINUED | OUTPATIENT
Start: 2021-12-11 | End: 2021-12-12

## 2021-12-11 RX ORDER — GARLIC EXTRACT 500 MG
1 CAPSULE ORAL DAILY
Status: DISCONTINUED | OUTPATIENT
Start: 2021-12-11 | End: 2021-12-12

## 2021-12-11 RX ORDER — PREDNISONE 1 MG/1
5 TABLET ORAL DAILY
Status: DISCONTINUED | OUTPATIENT
Start: 2021-12-11 | End: 2021-12-12

## 2021-12-11 RX ORDER — LEFLUNOMIDE 10 MG/1
10 TABLET ORAL DAILY
Status: DISCONTINUED | OUTPATIENT
Start: 2021-12-11 | End: 2021-12-12

## 2021-12-11 RX ORDER — METOCLOPRAMIDE HYDROCHLORIDE 5 MG/ML
5 INJECTION INTRAMUSCULAR; INTRAVENOUS EVERY 8 HOURS PRN
Status: DISCONTINUED | OUTPATIENT
Start: 2021-12-11 | End: 2021-12-12

## 2021-12-11 RX ORDER — PANTOPRAZOLE SODIUM 40 MG/1
40 TABLET, DELAYED RELEASE ORAL
Status: DISCONTINUED | OUTPATIENT
Start: 2021-12-11 | End: 2021-12-12

## 2021-12-11 RX ORDER — DEXTROSE MONOHYDRATE 25 G/50ML
50 INJECTION, SOLUTION INTRAVENOUS
Status: DISCONTINUED | OUTPATIENT
Start: 2021-12-11 | End: 2021-12-12

## 2021-12-11 RX ORDER — ALBUTEROL SULFATE 90 UG/1
2 AEROSOL, METERED RESPIRATORY (INHALATION) EVERY 4 HOURS PRN
Status: DISCONTINUED | OUTPATIENT
Start: 2021-12-11 | End: 2021-12-12

## 2021-12-11 RX ORDER — ATORVASTATIN CALCIUM 20 MG/1
20 TABLET, FILM COATED ORAL DAILY
Status: DISCONTINUED | OUTPATIENT
Start: 2021-12-11 | End: 2021-12-12

## 2021-12-11 RX ORDER — ONDANSETRON 2 MG/ML
4 INJECTION INTRAMUSCULAR; INTRAVENOUS EVERY 6 HOURS PRN
Status: DISCONTINUED | OUTPATIENT
Start: 2021-12-11 | End: 2021-12-12

## 2021-12-11 RX ORDER — SODIUM CHLORIDE 9 MG/ML
INJECTION, SOLUTION INTRAVENOUS CONTINUOUS
Status: DISCONTINUED | OUTPATIENT
Start: 2021-12-11 | End: 2021-12-12

## 2021-12-11 RX ORDER — HYDROCODONE BITARTRATE AND ACETAMINOPHEN 5; 325 MG/1; MG/1
1 TABLET ORAL EVERY 4 HOURS PRN
Status: DISCONTINUED | OUTPATIENT
Start: 2021-12-11 | End: 2021-12-12

## 2021-12-11 RX ORDER — INSULIN ASPART 100 [IU]/ML
0.2 INJECTION, SOLUTION INTRAVENOUS; SUBCUTANEOUS ONCE
Status: COMPLETED | OUTPATIENT
Start: 2021-12-11 | End: 2021-12-11

## 2021-12-11 RX ORDER — SODIUM CHLORIDE 9 MG/ML
INJECTION, SOLUTION INTRAVENOUS CONTINUOUS
Status: ACTIVE | OUTPATIENT
Start: 2021-12-11 | End: 2021-12-11

## 2021-12-11 RX ORDER — CLOTRIMAZOLE 10 MG/1
10 LOZENGE ORAL; TOPICAL
Status: DISCONTINUED | OUTPATIENT
Start: 2021-12-11 | End: 2021-12-12

## 2021-12-11 RX ORDER — SODIUM CHLORIDE, SODIUM LACTATE, POTASSIUM CHLORIDE, CALCIUM CHLORIDE 600; 310; 30; 20 MG/100ML; MG/100ML; MG/100ML; MG/100ML
INJECTION, SOLUTION INTRAVENOUS CONTINUOUS
Status: DISCONTINUED | OUTPATIENT
Start: 2021-12-11 | End: 2021-12-11

## 2021-12-11 RX ORDER — ALPRAZOLAM 0.25 MG/1
0.25 TABLET ORAL 4 TIMES DAILY PRN
Status: DISCONTINUED | OUTPATIENT
Start: 2021-12-11 | End: 2021-12-12

## 2021-12-11 RX ORDER — ACETAMINOPHEN 325 MG/1
650 TABLET ORAL EVERY 6 HOURS PRN
Status: DISCONTINUED | OUTPATIENT
Start: 2021-12-11 | End: 2021-12-12

## 2021-12-11 RX ORDER — MECLIZINE HCL 12.5 MG/1
25 TABLET ORAL 3 TIMES DAILY PRN
Status: DISCONTINUED | OUTPATIENT
Start: 2021-12-11 | End: 2021-12-12

## 2021-12-11 RX ORDER — SODIUM CHLORIDE 9 MG/ML
INJECTION, SOLUTION INTRAVENOUS CONTINUOUS
Status: DISCONTINUED | OUTPATIENT
Start: 2021-12-11 | End: 2021-12-11

## 2021-12-11 NOTE — ED PROVIDER NOTES
Patient Seen in: [facility] Emergency Department      History   Patient presents with:  Abnormal Labs    Stated Complaint: sent by pcp, blood glucose >800    Subjective:   HPI    66-year-old female presents emergency room for evaluation of hyperglyc • FLUOROSCOPIC GUIDANCE NEEDLE PLACEMENT Right 5/25/2016    Procedure: HIP INJECTION;  Surgeon: Boogie Woo DO;  Location: [address] [place] [address]   • FOOT SURGERY     • HIP REPLACEMENT SURGERY Bilateral 06/2017    R 2017, L 2018   • HIP REPLACEM No      Comment: none             Review of Systems    Positive for stated complaint: sent by pcp, blood glucose >800  Other systems are as noted in HPI. Constitutional and vital signs reviewed.       All other systems reviewed and negative except as noted Cells Many (*)     All other components within normal limits   VENOUS BLOOD GAS - Abnormal; Notable for the following components:    Venous pCO2 35 (*)     Venous pO2 51 (*)     Venous O2 Sat.  Calc. 84 (*)     Venous Bicarbonate 19.7 (*)     All other comp Initially discussed with pulmonary/critical care Dr. Herber William due to patient's level of hyperglycemia and possibility of early HHS, he was recommended we attempt to bring the glucose down in the emergency room and admit to the floor if possible as it was f

## 2021-12-11 NOTE — ED QUICK NOTES
Orders for admission, patient is aware of plan and ready to go upstairs. Any questions, please call ED JONAS Chaparro  at extension [phone]. Vaccinated? Yes. Type of COVID test sent: Rapid PCR, negative.   COVID Suspicion level: Low      Titratable drug(s) i

## 2021-12-11 NOTE — ED INITIAL ASSESSMENT (HPI)
Patient presents for evaluation of elevated glucose. She reports dizziness and shortness of breath recently that brought her to her doctor's office and they found an elevated glucose on outpatient labs of 809.  She reports consuming a sweet tea just prior t

## 2021-12-11 NOTE — PLAN OF CARE
Pt was up assisted up to the bath room she is not dizzy as she is doing this. Her blood sugar this am was 83 she was given her breakfast and she has eaten it.  She is alert and oriented says that her MD had just checked an A1C but she didn't tell me what th

## 2021-12-11 NOTE — H&P
[facility]    History and Physical     [facility] Patient Status:  Inpatient    1950 MRN [id number]   [facility] 2NE-A Attending Reddy Davidson MD   Hosp Day # 0 PCP Jeanette Rojas DO     Chief Complaint: Patient presents with 2008    deep skin biopsy   • CATARACT EXTRACTION W/ INTRAOCULAR LENS IMPLANT Right 04/05/2021     POOJA JOYCELYN 12.5 diopter posterior chamber lens serial #[id number]   • JOSE  12/2011, 2020   • Marly Sutton  12/19/2011    Performed by [facility]       Social History:  reports that she quit smoking about 50 years ago. Her smoking use included cigarettes. She has a 0.15 pack-year smoking history. She has never used smokeless tobacco. She reports current alcohol use.  She reports that she Soln, Inhale 2 puffs into the lungs every 4 (four) hours as needed. , Disp: 1 Inhaler, Rfl: 11  Estradiol (ESTRACE) 0.1 MG/GM Vaginal Cream, Apply 1/2 gram vaginally 2 times per week., Disp: 1 Tube, Rfl: 3  Omeprazole 40 MG Oral Capsule Delayed Release, Jaye Marie 3.  HEENT: Normocephalic atraumatic. Moist mucous membranes. EOM-I. PERRLA. Anicteric. Neck: No lymphadenopathy. No JVD. No carotid bruits. Respiratory: Clear to auscultation bilaterally. No wheezes. No rhonchi.   Cardiovascular: S1, S2. Regular rate and accuchecks, SSI, carb controlled diet  - diabetic education; pt may need home insulin pending results of A1c  - continue statin    UTI  - UA with biju pyuria  - continue empiric ctx pending cultures     CECIL   Possible CKD3  - Cr prev 0.8 11/2021  - 1.25 o

## 2021-12-11 NOTE — PROGRESS NOTES
Orthostats      12/11/21 0310 12/11/21 0317 12/11/21 0319   Vital Signs   /65 106/71 98/48   MAP (mmHg) 81 79 60   BP Location Right arm Right arm Right arm   BP Method Automatic Automatic Automatic   Patient Position Lying Sitting Standing

## 2021-12-11 NOTE — CONSULTS
[facility]    Report of Consultation    [facility] Derik Patient Status:  Inpatient    1950 MRN [id number]   [facility] 2NE-A Attending Reddy Davidson MD   Hosp Day # 0 PCP Jeanette Rojas DO     Date of consult: 2021    REASON F Monika Bledsoe DO;  Location: [address] [place] [address]   • FLUOROSCOPIC GUIDANCE NEEDLE PLACEMENT Right 5/25/2016    Procedure: HIP INJECTION;  Surgeon: Monika Bledsoe DO;  Location: [address] [place] [address]   • FOOT SURGERY     • HIP REPLACEMENT S cigarettes. She has a 0.15 pack-year smoking history. She has never used smokeless tobacco. She reports current alcohol use. She reports that she does not use drugs.     ALLERGIES:       Peanut Oil              OTHER (SEE COMMENTS)    Comment:Pine nuts - Ca Intravenous, Continuous  •  pantoprazole (PROTONIX) EC tab 40 mg, 40 mg, Oral, QAM AC  No current outpatient medications on file.         PHYSICAL EXAM:     Vital Signs: /57 (BP Location: Right arm)   Pulse 102   Temp 97.8 °F (36.6 °C) (Oral)   Resp 2 0.00 12/11/2021    BASABS 0.00 12/11/2021    NEUT 87 12/11/2021    LYMPH 5 12/11/2021    MON 0 12/11/2021    EOS 0 12/11/2021    BASO 0 12/11/2021    NEPERCENT 65.6 12/11/2021    LYPERCENT 23.7 12/11/2021    MOPERCENT 8.4 12/11/2021    EOPERCENT 1.4 12/11/ nsaids    Hyperkalemia  -- glucose control. IVFs. Low K diet. Monitor     Anemia  -- transfusion prn     HTN  -- BPs low. Hold ACEi    Hyponatremia  -- pseudo due to hyperglycemia.  Now resolved     D/w Dr Sharon Arauz    Thank you for allowing me to participate

## 2021-12-11 NOTE — PROGRESS NOTES
NURSING ADMISSION NOTE      Patient admitted via Cart  Oriented to room. Safety precautions initiated. Bed in low position. Call light in reach. Pt is alert and oriented. Orthostatic positive.  Ambulates with SBA for lightheadedness and unsteady g

## 2021-12-12 VITALS
RESPIRATION RATE: 18 BRPM | WEIGHT: 204 LBS | HEART RATE: 100 BPM | BODY MASS INDEX: 30.21 KG/M2 | OXYGEN SATURATION: 100 % | DIASTOLIC BLOOD PRESSURE: 86 MMHG | HEIGHT: 69 IN | TEMPERATURE: 98 F | SYSTOLIC BLOOD PRESSURE: 153 MMHG

## 2021-12-12 PROCEDURE — 82962 GLUCOSE BLOOD TEST: CPT

## 2021-12-12 PROCEDURE — 83735 ASSAY OF MAGNESIUM: CPT | Performed by: INTERNAL MEDICINE

## 2021-12-12 PROCEDURE — 80069 RENAL FUNCTION PANEL: CPT | Performed by: INTERNAL MEDICINE

## 2021-12-12 PROCEDURE — 85025 COMPLETE CBC W/AUTO DIFF WBC: CPT | Performed by: HOSPITALIST

## 2021-12-12 RX ORDER — CEFADROXIL 500 MG/1
500 CAPSULE ORAL 2 TIMES DAILY
Qty: 10 CAPSULE | Refills: 0 | Status: SHIPPED | OUTPATIENT
Start: 2021-12-13 | End: 2021-12-13 | Stop reason: ALTCHOICE

## 2021-12-12 RX ORDER — ATORVASTATIN CALCIUM 40 MG/1
40 TABLET, FILM COATED ORAL DAILY
Qty: 90 TABLET | Refills: 1 | Status: SHIPPED | OUTPATIENT
Start: 2021-12-12

## 2021-12-12 RX ORDER — ONDANSETRON 4 MG/1
4 TABLET, FILM COATED ORAL EVERY 8 HOURS PRN
Qty: 30 TABLET | Refills: 0 | Status: SHIPPED | OUTPATIENT
Start: 2021-12-12

## 2021-12-12 RX ORDER — BLOOD SUGAR DIAGNOSTIC
STRIP MISCELLANEOUS
Qty: 50 STRIP | Refills: 6 | Status: SHIPPED | OUTPATIENT
Start: 2021-12-12 | End: 2022-01-12

## 2021-12-12 RX ORDER — SEMAGLUTIDE 1.34 MG/ML
INJECTION, SOLUTION SUBCUTANEOUS
Qty: 10 EACH | Refills: 1 | Status: SHIPPED | OUTPATIENT
Start: 2021-12-12 | End: 2021-12-16 | Stop reason: ALTCHOICE

## 2021-12-12 RX ORDER — BLOOD-GLUCOSE METER
EACH MISCELLANEOUS
Qty: 1 KIT | Refills: 0 | Status: SHIPPED | OUTPATIENT
Start: 2021-12-12 | End: 2022-01-12

## 2021-12-12 NOTE — PLAN OF CARE
Assumed care at 1. Patient alert and oriented. RA. Denies any shortness of breath. VSS. NSR on tele. Education done on diabetes management and booklet provided. Diabetic educator consulted. Dietitian consulted.  Pt slightly unsteady while ambulating to b

## 2021-12-12 NOTE — PLAN OF CARE
Pr. Discharged home. IV removed, tele dc'd and returned to monitor tech. F/U instructions provided and discussed. Pt. And family verbalized understanding. Rx is given.  Discussed adverse reactions and side effects of all new medications and provided appropr

## 2021-12-12 NOTE — PROGRESS NOTES
[facility]    Nephrology Progress Note    [address] Attending:  Mariano Argueta MD     Cc: CECIL    SUBJECTIVE     Feels better    PHYSICAL EXAM   Vital signs: /69 (BP Location: Right arm)   Pulse 103   Temp 97.7 °F (36.5 °C) (Oral)   Resp 18 Oral, Q15 Min PRN  Heparin Sodium (Porcine) 5000 UNIT/ML injection 5,000 Units, 5,000 Units, Subcutaneous, Q8H DANNI  acetaminophen (TYLENOL) tab 650 mg, 650 mg, Oral, Q6H PRN  ondansetron (ZOFRAN) injection 4 mg, 4 mg, Intravenous, Q6H PRN  metoclopramide ( thickening. Left ureteral jet is identified. Prevoid bladder volume 66 mL. OTHER:  Negative.                               =====    CONCLUSION:      1. No hydronephrosis. 2. Nonobstructing 9 mm right renal calculus.                    Dictated Proteinuria   -- UPCR 0.5g/g, likely due to diabetes and UTI, can recheck as outpt w PCP and see nephrology if needed. Resume ACEi on discharge. Diabetes control.  Hydration     Nephrolithiasis   -- renal US w 9mm nonobstructing stone, no hydro     Hypo

## 2021-12-12 NOTE — PLAN OF CARE
Pt denies c/o pain, malaise, or cardiac symptoms. A&Ox4. Lungs clear bilaterally with equal expansion, on room air. Pt NSR/ST on monitor with regular rate. Abdomen soft and non-tender with active bowel sounds in all four quadrants. Continent of B&B.  Pt upd

## 2021-12-12 NOTE — PROGRESS NOTES
[facility] Hospitalist Progress Note     Leonila Coy [facility] Patient Status:  Inpatient    1950 MRN [id number]   [facility] 2NE-A Attending Jj Lindsay MD   Hosp Day # 1 PCP Juju Johnson DO     Chief Complaint: Patient prese mg/dL). No results for input(s): PTP, INR in the last 168 hours.          COVID-19 Lab Results    COVID-19  Lab Results   Component Value Date    COVID19 Not Detected 12/10/2021    COVID19 NOT DETECTED 04/23/2021    COVID19 NOT DETECTED 04/02/2021

## 2021-12-13 NOTE — PROGRESS NOTES
Cefadroxil ordered today By other provider- Dr Katarzyna Joy. OK to keep antibiotic as ordered by Dr Katarzyna Joy? Please advise. Thanks.

## 2021-12-13 NOTE — PROGRESS NOTES
Spoke with patient, advised her of results and Dr Tiffanie Tomas comments. Pt verbalized understanding and agreed. Pharmacy verified with patient. New prescription ordered and medication list in chart updated.     Dr Tiffanie Tomas, patient wanted to let you know that s

## 2021-12-14 ENCOUNTER — PATIENT OUTREACH (OUTPATIENT)
Dept: CASE MANAGEMENT | Age: 71
End: 2021-12-14

## 2021-12-14 NOTE — PROGRESS NOTES
Received VM from patient requesting assistance scheduling apt     [address] [facility] [place] [address]  [phone]  Apt made with  for felipe.  Dec. 21st @2pm    Patient notified

## 2021-12-25 ENCOUNTER — APPOINTMENT (OUTPATIENT)
Dept: GENERAL RADIOLOGY | Facility: HOSPITAL | Age: 71
DRG: 872 | End: 2021-12-25
Attending: EMERGENCY MEDICINE
Payer: MEDICARE

## 2021-12-25 ENCOUNTER — HOSPITAL ENCOUNTER (INPATIENT)
Facility: HOSPITAL | Age: 71
LOS: 3 days | Discharge: HOME | DRG: 872 | End: 2021-12-28
Attending: EMERGENCY MEDICINE | Admitting: HOSPITALIST
Payer: MEDICARE

## 2021-12-25 ENCOUNTER — APPOINTMENT (OUTPATIENT)
Dept: CT IMAGING | Facility: HOSPITAL | Age: 71
DRG: 872 | End: 2021-12-25
Attending: EMERGENCY MEDICINE
Payer: MEDICARE

## 2021-12-25 DIAGNOSIS — N18.9 ACUTE RENAL FAILURE SUPERIMPOSED ON CHRONIC KIDNEY DISEASE, UNSPECIFIED CKD STAGE, UNSPECIFIED ACUTE RENAL FAILURE TYPE (HCC): ICD-10-CM

## 2021-12-25 DIAGNOSIS — N17.9 ACUTE RENAL FAILURE SUPERIMPOSED ON CHRONIC KIDNEY DISEASE, UNSPECIFIED CKD STAGE, UNSPECIFIED ACUTE RENAL FAILURE TYPE (HCC): ICD-10-CM

## 2021-12-25 DIAGNOSIS — I95.9 HYPOTENSION, UNSPECIFIED HYPOTENSION TYPE: Primary | ICD-10-CM

## 2021-12-25 DIAGNOSIS — N39.0 URINARY TRACT INFECTION WITHOUT HEMATURIA, SITE UNSPECIFIED: ICD-10-CM

## 2021-12-25 DIAGNOSIS — R55 SYNCOPE AND COLLAPSE: ICD-10-CM

## 2021-12-25 PROBLEM — E87.1 HYPONATREMIA: Status: ACTIVE | Noted: 2021-12-25

## 2021-12-25 LAB
ALBUMIN SERPL-MCNC: 2.6 G/DL (ref 3.4–5)
ALBUMIN/GLOB SERPL: 0.6 {RATIO} (ref 1–2)
ALP LIVER SERPL-CCNC: 137 U/L
ALT SERPL-CCNC: 21 U/L
ANION GAP SERPL CALC-SCNC: 7 MMOL/L (ref 0–18)
AST SERPL-CCNC: 30 U/L (ref 15–37)
BASOPHILS # BLD AUTO: 0.08 X10(3) UL (ref 0–0.2)
BASOPHILS NFR BLD AUTO: 1.3 %
BILIRUB SERPL-MCNC: 0.3 MG/DL (ref 0.1–2)
BILIRUB UR QL STRIP.AUTO: NEGATIVE
BUN BLD-MCNC: 37 MG/DL (ref 7–18)
CALCIUM BLD-MCNC: 9 MG/DL (ref 8.5–10.1)
CHLORIDE SERPL-SCNC: 102 MMOL/L (ref 98–112)
CO2 SERPL-SCNC: 21 MMOL/L (ref 21–32)
COLOR UR AUTO: YELLOW
CREAT BLD-MCNC: 3.16 MG/DL
EOSINOPHIL # BLD AUTO: 0.12 X10(3) UL (ref 0–0.7)
EOSINOPHIL NFR BLD AUTO: 1.9 %
ERYTHROCYTE [DISTWIDTH] IN BLOOD BY AUTOMATED COUNT: 16.9 %
GLOBULIN PLAS-MCNC: 4.5 G/DL (ref 2.8–4.4)
GLUCOSE BLD-MCNC: 171 MG/DL (ref 70–99)
GLUCOSE BLD-MCNC: 186 MG/DL (ref 70–99)
GLUCOSE BLD-MCNC: 93 MG/DL (ref 70–99)
GLUCOSE BLD-MCNC: 99 MG/DL (ref 70–99)
GLUCOSE UR STRIP.AUTO-MCNC: NEGATIVE MG/DL
HCT VFR BLD AUTO: 34.8 %
HGB BLD-MCNC: 11.3 G/DL
IMM GRANULOCYTES # BLD AUTO: 0.09 X10(3) UL (ref 0–1)
IMM GRANULOCYTES NFR BLD: 1.4 %
KETONES UR STRIP.AUTO-MCNC: NEGATIVE MG/DL
LACTATE SERPL-SCNC: 0.9 MMOL/L (ref 0.4–2)
LIPASE SERPL-CCNC: 130 U/L (ref 73–393)
LYMPHOCYTES # BLD AUTO: 1.53 X10(3) UL (ref 1–4)
LYMPHOCYTES NFR BLD AUTO: 24 %
MCH RBC QN AUTO: 28.5 PG (ref 26–34)
MCHC RBC AUTO-ENTMCNC: 32.5 G/DL (ref 31–37)
MCV RBC AUTO: 87.7 FL
MONOCYTES # BLD AUTO: 0.77 X10(3) UL (ref 0.1–1)
MONOCYTES NFR BLD AUTO: 12.1 %
NEUTROPHILS # BLD AUTO: 3.78 X10 (3) UL (ref 1.5–7.7)
NEUTROPHILS # BLD AUTO: 3.78 X10(3) UL (ref 1.5–7.7)
NEUTROPHILS NFR BLD AUTO: 59.3 %
NITRITE UR QL STRIP.AUTO: POSITIVE
OSMOLALITY SERPL CALC.SUM OF ELEC: 283 MOSM/KG (ref 275–295)
PH UR STRIP.AUTO: 5 [PH] (ref 5–8)
PLATELET # BLD AUTO: 319 10(3)UL (ref 150–450)
POTASSIUM SERPL-SCNC: 4 MMOL/L (ref 3.5–5.1)
PROCALCITONIN SERPL-MCNC: 0.08 NG/ML (ref ?–0.16)
PROT SERPL-MCNC: 7.1 G/DL (ref 6.4–8.2)
PROT UR STRIP.AUTO-MCNC: NEGATIVE MG/DL
RBC # BLD AUTO: 3.97 X10(6)UL
SARS-COV-2 RNA RESP QL NAA+PROBE: NOT DETECTED
SODIUM SERPL-SCNC: 130 MMOL/L (ref 136–145)
SP GR UR STRIP.AUTO: 1.01 (ref 1–1.03)
TROPONIN I HIGH SENSITIVITY: 16 NG/L
UROBILINOGEN UR STRIP.AUTO-MCNC: <2 MG/DL
WBC # BLD AUTO: 6.4 X10(3) UL (ref 4–11)

## 2021-12-25 PROCEDURE — 87077 CULTURE AEROBIC IDENTIFY: CPT | Performed by: EMERGENCY MEDICINE

## 2021-12-25 PROCEDURE — 81001 URINALYSIS AUTO W/SCOPE: CPT | Performed by: EMERGENCY MEDICINE

## 2021-12-25 PROCEDURE — 96365 THER/PROPH/DIAG IV INF INIT: CPT | Performed by: EMERGENCY MEDICINE

## 2021-12-25 PROCEDURE — 93005 ELECTROCARDIOGRAM TRACING: CPT

## 2021-12-25 PROCEDURE — 87040 BLOOD CULTURE FOR BACTERIA: CPT | Performed by: EMERGENCY MEDICINE

## 2021-12-25 PROCEDURE — 99285 EMERGENCY DEPT VISIT HI MDM: CPT | Performed by: EMERGENCY MEDICINE

## 2021-12-25 PROCEDURE — P9045 ALBUMIN (HUMAN), 5%, 250 ML: HCPCS | Performed by: INTERNAL MEDICINE

## 2021-12-25 PROCEDURE — 80053 COMPREHEN METABOLIC PANEL: CPT | Performed by: EMERGENCY MEDICINE

## 2021-12-25 PROCEDURE — 83605 ASSAY OF LACTIC ACID: CPT | Performed by: EMERGENCY MEDICINE

## 2021-12-25 PROCEDURE — 70450 CT HEAD/BRAIN W/O DYE: CPT | Performed by: EMERGENCY MEDICINE

## 2021-12-25 PROCEDURE — 87086 URINE CULTURE/COLONY COUNT: CPT | Performed by: EMERGENCY MEDICINE

## 2021-12-25 PROCEDURE — 82962 GLUCOSE BLOOD TEST: CPT

## 2021-12-25 PROCEDURE — 87186 SC STD MICRODIL/AGAR DIL: CPT | Performed by: EMERGENCY MEDICINE

## 2021-12-25 PROCEDURE — 93010 ELECTROCARDIOGRAM REPORT: CPT | Performed by: EMERGENCY MEDICINE

## 2021-12-25 PROCEDURE — 96375 TX/PRO/DX INJ NEW DRUG ADDON: CPT | Performed by: EMERGENCY MEDICINE

## 2021-12-25 PROCEDURE — 83690 ASSAY OF LIPASE: CPT | Performed by: EMERGENCY MEDICINE

## 2021-12-25 PROCEDURE — 96361 HYDRATE IV INFUSION ADD-ON: CPT | Performed by: EMERGENCY MEDICINE

## 2021-12-25 PROCEDURE — 71045 X-RAY EXAM CHEST 1 VIEW: CPT | Performed by: EMERGENCY MEDICINE

## 2021-12-25 PROCEDURE — 84484 ASSAY OF TROPONIN QUANT: CPT | Performed by: EMERGENCY MEDICINE

## 2021-12-25 PROCEDURE — 84145 PROCALCITONIN (PCT): CPT | Performed by: INTERNAL MEDICINE

## 2021-12-25 PROCEDURE — 85025 COMPLETE CBC W/AUTO DIFF WBC: CPT | Performed by: EMERGENCY MEDICINE

## 2021-12-25 PROCEDURE — 74176 CT ABD & PELVIS W/O CONTRAST: CPT | Performed by: EMERGENCY MEDICINE

## 2021-12-25 RX ORDER — HYDROCODONE BITARTRATE AND ACETAMINOPHEN 5; 325 MG/1; MG/1
1 TABLET ORAL EVERY 4 HOURS PRN
Status: DISCONTINUED | OUTPATIENT
Start: 2021-12-25 | End: 2021-12-28

## 2021-12-25 RX ORDER — ATORVASTATIN CALCIUM 40 MG/1
40 TABLET, FILM COATED ORAL DAILY
Status: DISCONTINUED | OUTPATIENT
Start: 2021-12-25 | End: 2021-12-28

## 2021-12-25 RX ORDER — ALBUMIN, HUMAN INJ 5% 5 %
250 SOLUTION INTRAVENOUS ONCE
Status: COMPLETED | OUTPATIENT
Start: 2021-12-25 | End: 2021-12-25

## 2021-12-25 RX ORDER — MECLIZINE HCL 12.5 MG/1
12.5 TABLET ORAL 3 TIMES DAILY PRN
Status: DISCONTINUED | OUTPATIENT
Start: 2021-12-25 | End: 2021-12-28

## 2021-12-25 RX ORDER — SODIUM CHLORIDE 9 MG/ML
INJECTION, SOLUTION INTRAVENOUS CONTINUOUS
Status: DISCONTINUED | OUTPATIENT
Start: 2021-12-25 | End: 2021-12-28

## 2021-12-25 RX ORDER — DIPHENHYDRAMINE HYDROCHLORIDE 50 MG/ML
50 INJECTION INTRAMUSCULAR; INTRAVENOUS ONCE
Status: COMPLETED | OUTPATIENT
Start: 2021-12-25 | End: 2021-12-25

## 2021-12-25 RX ORDER — RAMIPRIL 5 MG/1
20 CAPSULE ORAL EVERY EVENING
Status: DISCONTINUED | OUTPATIENT
Start: 2021-12-25 | End: 2021-12-25

## 2021-12-25 RX ORDER — PREGABALIN 25 MG/1
50 CAPSULE ORAL DAILY
Status: DISCONTINUED | OUTPATIENT
Start: 2021-12-25 | End: 2021-12-28

## 2021-12-25 RX ORDER — FLUTICASONE PROPIONATE 50 MCG
2 SPRAY, SUSPENSION (ML) NASAL
Status: DISCONTINUED | OUTPATIENT
Start: 2021-12-25 | End: 2021-12-28

## 2021-12-25 RX ORDER — PANTOPRAZOLE SODIUM 40 MG/1
40 TABLET, DELAYED RELEASE ORAL
Refills: 5 | Status: DISCONTINUED | OUTPATIENT
Start: 2021-12-26 | End: 2021-12-28

## 2021-12-25 RX ORDER — ALBUTEROL SULFATE 90 UG/1
2 AEROSOL, METERED RESPIRATORY (INHALATION) EVERY 4 HOURS PRN
Status: DISCONTINUED | OUTPATIENT
Start: 2021-12-25 | End: 2021-12-28

## 2021-12-25 RX ORDER — DEXTROSE MONOHYDRATE 25 G/50ML
50 INJECTION, SOLUTION INTRAVENOUS
Status: DISCONTINUED | OUTPATIENT
Start: 2021-12-25 | End: 2021-12-28

## 2021-12-25 NOTE — ED PROVIDER NOTES
Patient Seen in: [facility] Emergency Department      History   Patient presents with:  Fall    Stated Complaint: Syncope, hit head in bathroom, no loc, not on blood thinners    Subjective:   HPI    15-year-old female presents to the emergency depar Physical Exam  Vitals and nursing note reviewed. Constitutional:       Appearance: Normal appearance. She is well-developed. HENT:      Head: Normocephalic and atraumatic. Cardiovascular:      Rate and Rhythm: Normal rate and regular rhythm. Abnormal; Notable for the following components:    POC Glucose 186 (*)     All other components within normal limits   CBC W/ DIFFERENTIAL - Abnormal; Notable for the following components:    HGB 11.3 (*)     HCT 34.8 (*)     All other components within no findings are also stable previously measuring 32.7 x 19.1 mm. There are no new pulmonary nodules. Heart/vascular: Cardiac size is normal. There is no pericardial effusion. Left anterior descending coronary artery wall calcifications are evident.  Mediastinu 12/25/2021 at 2:00 PM       US KIDNEY/BLADDER (NFH=37299)    Result Date: 12/11/2021  PROCEDURE:  US KIDNEY/BLADDER (CPT=76770)  COMPARISON:  None.   INDICATIONS:  worsening CECIL  TECHNIQUE:  Transabdominal gray scale ultrasound imaging of the bilateral kidn is a stone within the right lower pole of the kidney measuring 15 x 10 mm in size. This is within the entire right lower pole calyx. There is no hydronephrosis. The renal cortex appears unremarkable on the right.   The left kidney is unremarkable without size.  The lungs are clear. There are no pleural effusions. The bones are unremarkable. CONCLUSION:  No acute disease.      Dictated by (CST): Miriam Garland MD on 12/25/2021 at 12:23 PM     Finalized by (CST): Miriam Garland MD on 12/25/2021 at issues we will also obtain a CT scan to be certain that she does not have an obstructing infected stone is giving her issues. Currently she does not have any flank pain. She had a CT scan of her head that shows no acute abnormality.   Patient was admitted

## 2021-12-25 NOTE — H&P
.  CC: Patient presents with:  Fall       PCP: Cathy Monsivais DO    History of Present Illness: Patient is a 70year old female with PMH sig for h/o HENRI, HTN, DM, RA (on cimzia and arava) who presented with LH/hypotension to 60s/palp.  She fell this morning Stacy 53 BIOPSY LEFT  2008    deep skin biopsy   • CATARACT EXTRACTION W/ INTRAOCULAR LENS IMPLANT Right 04/05/2021     POOJA ZCBOO 12.5 diopter posterior chamber lens serial #[id number]   • COLONOSCOPY  12/2011, 2020   • COLONOSCOPY,REMV Keny Howell MD;  Location: [address] [place] [address]        ALL:    Peanut Oil              OTHER (SEE COMMENTS)    Comment:Pine nuts - Canker Sores in mouth             Pine nuts - Canker Sores in mouth  Humira                  RASH    Comment:reportor daily., Disp: , Rfl:           Soc Hx  Social History    Tobacco Use      Smoking status: Former Smoker        Packs/day: 0.15        Years: 1.00        Pack years: .15        Types: Cigarettes        Quit date: 1971        Years since quittin.4 clear    Radiology: CT CHEST (CPT=71250)    Result Date: 12/17/2021  DATE OF SERVICE: 12.16.2021 CT CHEST WITHOUT CONTRAST HISTORY: Abnormal CT of the chest. COMPARISON: CT chest 4/21/2021.  TECHNIQUE: Axial scans are acquired from the thoracic inlet throug ACR (American College of Radiology) NRDR ([address]) which includes the Dose Index Registry. PATIENT STATED HISTORY: (As transcribed by Technologist)  The patietn fell today, hit back of head.     FINDINGS:  VENTRICLES/SULCI:  Anna Soriano IV,NO ORAL)(MCI=91588)    Result Date: 12/25/2021  PROCEDURE:  CT ABDOMEN+PELVIS KIDNEYSTONE 2D RNDR(NO IV,NO ORAL)(CPT=74176)  COMPARISON:  External Exams, CT, CT ABD(C/S) / PELVIS(C/S) -SET, 5/16/2006, 10:07 AM.  [facility] , , US KIDNEY/BLADDER (XZV=67237 the laminectomy spaces of L2-L4. PELVIC ORGANS:  Normal for age. LUNG BASES:  There is probable pleural plaque in the medial left lung with calcification. CONCLUSION:   1. Nonobstructing stone in the right lower pole of the kidney.   2. There i 12/10/2021 at 10:58 PM          Available outpatient records reviewed--    ASSESSMENT / PLAN:   Hypotension- suspect sepsis 2/2 UTI (h/o esbl e coli)  - on zosyn  - lactic acid okay  - CT abd okay  - await blood and urine cultures  - icu monitoring, prn pr

## 2021-12-25 NOTE — CONSULTS
NAME: Tc Nolan - ROOM: / - MRN: [id number] - Age: 70year old - :  1950    Date of Admission: 2021 11:07 AM  Admission Diagnosis: No admission diagnoses are documented for this encounter.     Reason for Consult: Hypotension     History o 5/25/2016    Procedure: HIP INJECTION;  Surgeon: Bradley Guzmán DO;  Location: [address] [place] [address]   • FLUOROSCOPIC GUIDANCE NEEDLE PLACEMENT Right 5/25/2016    Procedure: HIP INJECTION;  Surgeon: Bradley Guzmán DO;  Location: [facility] SURGICAL BLOSSOM Packs/day: 0.15        Years: 1.00        Pack years: .15        Types: Cigarettes        Quit date: 1971        Years since quittin.4      Smokeless tobacco: Never Used      Tobacco comment: quit 40 years ago, smoked a pack a week.     Vaping Use have personally reviewed the imaging. ASSESSMENT/PLAN:  1. Sepsis 2/2 UTI   -continue zosyn   -trend LA   -monitor urine and blood Cx  -covid negative   -CT abd unrevealing   2.  Hypotension   -suspect due to above   -IVF per protocol   -prn pressors fo

## 2021-12-25 NOTE — ED INITIAL ASSESSMENT (HPI)
PT PRESENTS TO ED WITH SYNCOPAL EPISODE, UNWITNESSED, HIT HEAD IN BATHROOM THIS MORNING, PT STARTED NEW DIABETIC MEDICATION THIS PAST WEEK AND HAS FELT DIZZY AND WEAK SINCE SHE STARTED MEDICATION. DENIES LOC, PT DENIES PAIN, REMEMBERS FALL.   PT IS IN C CO

## 2021-12-26 ENCOUNTER — APPOINTMENT (OUTPATIENT)
Dept: ULTRASOUND IMAGING | Facility: HOSPITAL | Age: 71
DRG: 872 | End: 2021-12-26
Attending: INTERNAL MEDICINE
Payer: MEDICARE

## 2021-12-26 LAB
ANION GAP SERPL CALC-SCNC: 7 MMOL/L (ref 0–18)
ATRIAL RATE: 92 BPM
BUN BLD-MCNC: 26 MG/DL (ref 7–18)
CALCIUM BLD-MCNC: 8.1 MG/DL (ref 8.5–10.1)
CHLORIDE SERPL-SCNC: 115 MMOL/L (ref 98–112)
CO2 SERPL-SCNC: 20 MMOL/L (ref 21–32)
CORTIS SERPL-MCNC: 5.2 UG/DL
CREAT BLD-MCNC: 2.02 MG/DL
GLUCOSE BLD-MCNC: 100 MG/DL (ref 70–99)
GLUCOSE BLD-MCNC: 103 MG/DL (ref 70–99)
GLUCOSE BLD-MCNC: 122 MG/DL (ref 70–99)
GLUCOSE BLD-MCNC: 139 MG/DL (ref 70–99)
GLUCOSE BLD-MCNC: 91 MG/DL (ref 70–99)
OSMOLALITY SERPL CALC.SUM OF ELEC: 298 MOSM/KG (ref 275–295)
P AXIS: 66 DEGREES
P-R INTERVAL: 152 MS
POTASSIUM SERPL-SCNC: 4.2 MMOL/L (ref 3.5–5.1)
Q-T INTERVAL: 370 MS
QRS DURATION: 86 MS
QTC CALCULATION (BEZET): 457 MS
R AXIS: 2 DEGREES
SODIUM SERPL-SCNC: 142 MMOL/L (ref 136–145)
T AXIS: 68 DEGREES
VENTRICULAR RATE: 92 BPM

## 2021-12-26 PROCEDURE — 80048 BASIC METABOLIC PNL TOTAL CA: CPT | Performed by: HOSPITALIST

## 2021-12-26 PROCEDURE — 76775 US EXAM ABDO BACK WALL LIM: CPT | Performed by: INTERNAL MEDICINE

## 2021-12-26 PROCEDURE — 82962 GLUCOSE BLOOD TEST: CPT

## 2021-12-26 PROCEDURE — P9045 ALBUMIN (HUMAN), 5%, 250 ML: HCPCS | Performed by: INTERNAL MEDICINE

## 2021-12-26 PROCEDURE — 82533 TOTAL CORTISOL: CPT | Performed by: INTERNAL MEDICINE

## 2021-12-26 RX ORDER — ALBUMIN, HUMAN INJ 5% 5 %
500 SOLUTION INTRAVENOUS ONCE
Status: COMPLETED | OUTPATIENT
Start: 2021-12-26 | End: 2021-12-26

## 2021-12-26 RX ORDER — PREDNISONE 1 MG/1
5 TABLET ORAL
Status: DISCONTINUED | OUTPATIENT
Start: 2021-12-26 | End: 2021-12-28

## 2021-12-26 RX ORDER — HEPARIN SODIUM 5000 [USP'U]/ML
5000 INJECTION, SOLUTION INTRAVENOUS; SUBCUTANEOUS EVERY 8 HOURS SCHEDULED
Status: DISCONTINUED | OUTPATIENT
Start: 2021-12-26 | End: 2021-12-28

## 2021-12-26 RX ORDER — SODIUM CHLORIDE, SODIUM LACTATE, POTASSIUM CHLORIDE, CALCIUM CHLORIDE 600; 310; 30; 20 MG/100ML; MG/100ML; MG/100ML; MG/100ML
INJECTION, SOLUTION INTRAVENOUS CONTINUOUS
Status: DISCONTINUED | OUTPATIENT
Start: 2021-12-26 | End: 2021-12-26

## 2021-12-26 NOTE — PROGRESS NOTES
12/26/21 0807 12/26/21 0810 12/26/21 0814   Vitals   Temp 98 °F (36.7 °C)  --   --    Temp src Temporal  --   --    Pulse 87 96 100   Heart Rate Source Monitor Monitor Monitor   Resp 16 20 (!) 27   /62 122/69 96/69   MAP (mmHg) 80 83 76   BP Locat

## 2021-12-26 NOTE — PROGRESS NOTES
12/26/21 1353 12/26/21 1356 12/26/21 1400   Vitals   Pulse 91 97 103   Resp 19 18 16   /61 116/58 122/71   MAP (mmHg) 85 66 81   BP Location  --   --  Right arm   Patient Position Lying Sitting Standing   Oxygen Therapy   SpO2 97 % 97 % 96 %   O2

## 2021-12-26 NOTE — PROGRESS NOTES
Pharmacy Note: Renal dose adjustment   Magui More was originally prescribed piperacillin-tazobactam 3.375 gm every 8 hours which was renally dose adjusted at the time of the original order per P&T approved renal dosing protocol.   Renal function has now i

## 2021-12-26 NOTE — PROGRESS NOTES
[facility]    PCP: Lucretia Crowell DO    Chief Complaint:  F/u hypotension    SUBJECTIVE:  BP improving, pt reports that she feels okay and less dizzy when getting up.     OBJECTIVE:  Temp:  [98 °F (36.7 °C)-98.9 °F (37.2 °C)] 98 °F (36.7 °C)  P mL/hr at 12/26/21 1130     albuterol, fluticasone propionate, HYDROcodone-acetaminophen, meclizine, glucose **OR** glucose-vitamin C **OR** dextrose **OR** glucose **OR** glucose-vitamin C       Assessment/Plan:     Hypotension- suspect sepsis 2/2 UTI (h/o

## 2021-12-26 NOTE — PLAN OF CARE
Pt received from ED for hypotension due to suspected urosepsis. Sepsis bolus completed. Per Dr. Brigette Baldwin, continue IVF with goal MAP >or equal to 60. Patient and 's questions answered, updated on plan of care. Purewick in place.    Contact precau

## 2021-12-26 NOTE — PROGRESS NOTES
[facility] Pharmacy Note:  Renal Adjustment for piperacillin/tazobactam (Blair Stoner)    Marc Butler is a 70year old patient who has been prescribed piperacillin/tazobactam (ZOSYN) 3.375 g every 8 hrs.   The estimated creatinine clearance is 17.1 mL/min (A) (based on

## 2021-12-26 NOTE — PLAN OF CARE
Pt resting in bed upon initial assessment, A/Ox3. Denies pain, respirations even and non labored. BP in the 120's. Denies dizziness/lightheadedness.  went home for the night. Reviewed plan to monitor BP thru the night and begin meds/gtts if needed.

## 2021-12-26 NOTE — PROGRESS NOTES
Critical Care Progress Note     Assessment / Plan:  1. Sepsis 2/2 UTI   -continue zosyn   -trend LA   -Urine Cx with E.coli   -covid negative   -CT abd unrevealing   2. Hypotension   -suspect due to above   -IVF per protocol   -AM cortisol 5.2.  Will d/w wi

## 2021-12-26 NOTE — PLAN OF CARE
A/o x3   Denies Chest pain, sob  Up and walking around with assist.   +orthostatics. Pt states that she is always a little dizzy. Dr. [place] aware of +orthostatics and 500ml bolus of LR and 500ml of albumin to be given. And recheck b/p.   Possible transfer t perfusion - ex.  Angina  - Evaluate fluid balance, assess for edema, trend weights  Outcome: Progressing     Problem: RESPIRATORY - ADULT  Goal: Achieves optimal ventilation and oxygenation  Description: INTERVENTIONS:  - Assess for changes in respiratory s for signs and symptoms of hyperglycemia and hypoglycemia  - Administer ordered medications to maintain glucose within target range  - Assess barriers to adequate nutritional intake and initiate nutrition consult as needed  - Instruct patient on self manage

## 2021-12-27 ENCOUNTER — APPOINTMENT (OUTPATIENT)
Dept: CV DIAGNOSTICS | Facility: HOSPITAL | Age: 71
DRG: 872 | End: 2021-12-27
Attending: HOSPITALIST
Payer: MEDICARE

## 2021-12-27 LAB
ANION GAP SERPL CALC-SCNC: 6 MMOL/L (ref 0–18)
BUN BLD-MCNC: 12 MG/DL (ref 7–18)
CALCIUM BLD-MCNC: 7.9 MG/DL (ref 8.5–10.1)
CHLORIDE SERPL-SCNC: 117 MMOL/L (ref 98–112)
CO2 SERPL-SCNC: 17 MMOL/L (ref 21–32)
CREAT BLD-MCNC: 1.14 MG/DL
GLUCOSE BLD-MCNC: 105 MG/DL (ref 70–99)
GLUCOSE BLD-MCNC: 128 MG/DL (ref 70–99)
GLUCOSE BLD-MCNC: 131 MG/DL (ref 70–99)
GLUCOSE BLD-MCNC: 142 MG/DL (ref 70–99)
GLUCOSE BLD-MCNC: 97 MG/DL (ref 70–99)
OSMOLALITY SERPL CALC.SUM OF ELEC: 290 MOSM/KG (ref 275–295)
POTASSIUM SERPL-SCNC: 4.7 MMOL/L (ref 3.5–5.1)
SODIUM SERPL-SCNC: 140 MMOL/L (ref 136–145)

## 2021-12-27 PROCEDURE — 93306 TTE W/DOPPLER COMPLETE: CPT | Performed by: HOSPITALIST

## 2021-12-27 PROCEDURE — 76937 US GUIDE VASCULAR ACCESS: CPT

## 2021-12-27 PROCEDURE — 82962 GLUCOSE BLOOD TEST: CPT

## 2021-12-27 PROCEDURE — 36410 VNPNXR 3YR/> PHY/QHP DX/THER: CPT

## 2021-12-27 PROCEDURE — 80048 BASIC METABOLIC PNL TOTAL CA: CPT | Performed by: HOSPITALIST

## 2021-12-27 RX ORDER — DIPHENHYDRAMINE HCL 25 MG
25 CAPSULE ORAL NIGHTLY PRN
Status: DISCONTINUED | OUTPATIENT
Start: 2021-12-27 | End: 2021-12-28

## 2021-12-27 NOTE — PROGRESS NOTES
A/O X 4  Room air  Tele NSR  Contact precautions for ESBL in urine  BP stable  C/O some dizziness while standing, able to ambulate with standby assist to restroom  Briefed for episodes of incontinence  Mirna IV  Plan for Echo to be done  Accuchecks QID  Pt

## 2021-12-27 NOTE — CONSULTS
INFECTIOUS DISEASE CONSULT NOTE    Lindsay More Patient Status:  Inpatient    1950 MRN [id number]   [facility] 3NE-A Attending Snow Gutierrez, [address] [facility] Day # 2 PCP Yahir Loera INJECTION;  Surgeon: García Mcclellan DO;  Location: [address] [place] [address]   • FLUOROSCOPIC GUIDANCE NEEDLE PLACEMENT Right 5/25/2016    Procedure: HIP INJECTION;  Surgeon: García Mcclellan DO;  Location: [address] [place] [address]   • FOOT SURGERY use included cigarettes. She has a 0.15 pack-year smoking history. She has never used smokeless tobacco. She reports current alcohol use of about 1.0 standard drink of alcohol per week. She reports that she does not use drugs.     Allergies:    Peanut Oil pertinent positives and negatives in the the HPI. Constitutional: Negative for chills, fevers. Eyes: Negative for eye drainage and redness.   Ears, nose, mouth, throat: Negative for nasal congestion, sore throat  Respiratory: Negative for cough, wheezin K 4.72 4.0 4.2 4.7    102 115* 117*   CO2 13.6* 21.0 20.0* 17.0*   ALKPHO 159* 137  --   --    AST 33* 30  --   --    ALT 16 21  --   --    BILT 0.26 0.3  --   --    TP 6.8 7.1  --   --      Erythrocyte Sedimentation Rate (mm/hr)   Date Value   06/ and sagittal reformatted images of the thorax are also evaluated. Automated exposure control and ALARA manual techniques for patient specific dose reduction were followed while maintaining the necessary diagnostic image quality. FINDINGS: Lung fields:  Ther size.  INTRACRANIAL:  There are no abnormal extraaxial fluid collections. There is no midline shift. There are no intraparenchymal brain abnormalities. There is nothing specific for acute infarct. There is no hemorrhage or mass lesion.   SINUSES: worsening CECIL  TECHNIQUE:  Transabdominal gray scale ultrasound imaging of the bilateral kidneys and bladder was performed. Routine technique was utilized.    PATIENT STATED HISTORY: (As transcribed by Technologist)     FINDINGS:   RIGHT KIDNEY MEASUREMENT 2D study was performed. Additional evaluation included M-mode, complete spectral Doppler, and color Doppler. Inpatient. Bedside. This was a routine echocardiographic study. Transthoracic echocardiography for ventricular function evaluation.  Image qualit valve:   Structurally normal valve. Leaflet separation was normal.  Doppler:  Transvalvular velocity was within the normal range. There was no evidence for stenosis. There was trivial regurgitation. Pulmonic valve:    No significant valve disease.     Tariq Ken E-wave peak velocity            0.58  m/sec  ---------  Mitral A-wave peak velocity            0.86  m/sec  ---------  Mitral E/A ratio, peak                 0.7          ---------   Pulmonary arteries                     Value        Reference  PA andi includes the Dose Index Registry. PATIENT STATED HISTORY: (As transcribed by Technologist)  Acute kidney failure. FINDINGS:  KIDNEYS:  There is a stone within the right lower pole of the kidney measuring 15 x 10 mm in size.   This is within the entire r on blood thinners  PATIENT STATED HISTORY: (As transcribed by Technologist)    Syncope, hit head in bathroom. FINDINGS:  The heart is normal in size. The lungs are clear. There are no pleural effusions. The bones are unremarkable.             [place] based on her progress.     Paul Waters MD  12/27/2021  2:01 PM

## 2021-12-27 NOTE — PLAN OF CARE
Patient alert and oriented x4. On RA, . NSR on tele. Denies pain. IVF. IV abx. ID consulted. Plan for midline. Resting comfortably in bed.  at bedside. Updated on poc. WCTM.

## 2021-12-27 NOTE — PHYSICAL THERAPY NOTE
PT order received and chart reviewed. Admitted UTI and on contact precaution for C-diff. Stated that she has been going to many times in the restroom and has been sleeping well for the past 2 days.  Stated that functionally she is at her baseline but is tir

## 2021-12-27 NOTE — PROGRESS NOTES
[facility] Hospitalist note    PCP: Janey Kahn DO    Chief Complaint:  F/u hypotension    SUBJECTIVE:  hasnt slept well since being here so she is tired. Wants to go home. No cp, sob, n/v, or dizziness. She had several episodes of diarrhea overnight.     OB piperacillin-tazobactam  3.375 g Intravenous Q8H   • atorvastatin  40 mg Oral Daily   • fluticasone furoate-vilanterol  1 puff Inhalation Daily   • pantoprazole  40 mg Oral QAM AC   • pregabalin  50 mg Oral Daily   • Insulin Aspart Pen  1-5 Units Subcutane

## 2021-12-27 NOTE — PROGRESS NOTES
Received from ICU. The patient is A/O x 4, VSS, on RA, no SOB, NSR on tele, denies having pain, afebrile. Denies feeling dizzy with ambulation. On IV abx for UTI. IVF - 0.9 NS @ 125ml/hr. QIDS accuchecks. Contact isolation for ESBL.  Fall risk precautions i

## 2021-12-28 VITALS
HEART RATE: 92 BPM | OXYGEN SATURATION: 96 % | DIASTOLIC BLOOD PRESSURE: 90 MMHG | WEIGHT: 204 LBS | SYSTOLIC BLOOD PRESSURE: 183 MMHG | RESPIRATION RATE: 17 BRPM | BODY MASS INDEX: 30.21 KG/M2 | HEIGHT: 69 IN | TEMPERATURE: 98 F

## 2021-12-28 LAB
C DIFF TOX B STL QL: NEGATIVE
GLUCOSE BLD-MCNC: 102 MG/DL (ref 70–99)
GLUCOSE BLD-MCNC: 142 MG/DL (ref 70–99)

## 2021-12-28 PROCEDURE — 87493 C DIFF AMPLIFIED PROBE: CPT | Performed by: HOSPITALIST

## 2021-12-28 PROCEDURE — 82962 GLUCOSE BLOOD TEST: CPT

## 2021-12-28 RX ORDER — RAMIPRIL 5 MG/1
20 CAPSULE ORAL DAILY
Status: DISCONTINUED | OUTPATIENT
Start: 2021-12-28 | End: 2021-12-28

## 2021-12-28 RX ORDER — RAMIPRIL 5 MG/1
20 CAPSULE ORAL EVERY EVENING
Status: DISCONTINUED | OUTPATIENT
Start: 2021-12-28 | End: 2021-12-28

## 2021-12-28 RX ORDER — LOPERAMIDE HYDROCHLORIDE 2 MG/1
4 CAPSULE ORAL 4 TIMES DAILY PRN
Status: DISCONTINUED | OUTPATIENT
Start: 2021-12-28 | End: 2021-12-28

## 2021-12-28 RX ORDER — LEFLUNOMIDE 20 MG/1
20 TABLET ORAL DAILY
Qty: 30 TABLET | Refills: 11 | Status: SHIPPED | COMMUNITY
Start: 2022-01-11

## 2021-12-28 RX ORDER — PREDNISONE 1 MG/1
5 TABLET ORAL
Qty: 30 TABLET | Refills: 0 | Status: ON HOLD | OUTPATIENT
Start: 2021-12-29 | End: 2022-01-15

## 2021-12-28 NOTE — BH PROGRESS NOTE
Pt A&O x4. RA, NSR on tele. Pt denies pain at this time. Pt on IV abx. Pt midline flushed and assess. Pt on Contact isolation. Pt blood glucose monitored. Pt denies any dizziness.

## 2021-12-28 NOTE — CM/SW NOTE
MSW met with pt to discuss post dc needs. 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 MSW updated Shahana from [facility], she will call to schedule with pt.

## 2021-12-28 NOTE — DISCHARGE SUMMARY
General Medicine Discharge Summary     Patient ID:  Vijaya More  70year old  1/6/1950    Admit date: 12/25/2021    Discharge date and time: 12/28/21    Attending Physician: Ashleigh William DO admit. Has had baseline Cr close to 1 typically  - imaging without hydronephrosis, has nonobstructing stone  -back at baseline     RA- pt on cimzia, arava (will hold).  Cont pred 5mg and f/u rheum to taper     DM- recent hyperglycemia in setting of increase MCG/INH Inhalation Aerosol Powder, Breath Activated  Inhale 1 puff into the lungs daily. aspirin-acetaminophen-caffeine 250-250-65 MG Oral Tab  Take 1 tablet by mouth every 6 (six) hours as needed for Pain.     Fluticasone Propionate 50 MCG/ACT Nasal Cynthia provider.           Activity: activity as tolerated  Diet: cardiac diet  Wound Care: as directed  Code Status: Full Code      Exam on day of discharge:      12/28/21  1230   BP:    Pulse:    Resp:    Temp: 97.5 °F (36.4 °C)       General: no acute distress,

## 2021-12-28 NOTE — PROGRESS NOTES
INFECTIOUS DISEASE PROGRESS NOTE    Terri Funes [facility] Patient Status:  Inpatient    1950 MRN [id number]   [facility] 3NE-A Attending Kev Hawkins, [address] [facility] Day # 3 PCP Janey Kahn Intravenous, Q15 Min PRN **OR** glucose (DEX4) oral liquid 30 g, 30 g, Oral, Q15 Min PRN **OR** glucose-vitamin C (DEX-4) chewable tab 8 tablet, 8 tablet, Oral, Q15 Min PRN  •  Insulin Aspart Pen (NOVOLOG) 100 UNIT/ML flexpen 1-5 Units, 1-5 Units, Subcutan 12/25/21  [address] [place] [address] 1.008   GLUUR Negative   BILUR Negative   KETUR Negative   BLOODURINE Small*   PHURINE 5.0   PROUR Negative   UROBILINOGEN <2.0   NITRITE Positive*   LEUUR Large*   WBCUR 21-50*   RBCUR 6-10* costophrenic sulcus a 31.4 x 18.8 mm masslike focus is observed with peripheral curvilinear calcification and a subjacent tiny nodule. The findings are also stable previously measuring 32.7 x 19.1 mm. There are no new pulmonary nodules.  Heart/vascular: Car No acute intracranial process.    Dictated by (CST): Temo David MD on 12/25/2021 at 1:59 PM     Finalized by (CST): Temo David MD on 12/25/2021 at 2:00 PM       [address] [place] [address] (OFV=[id number])    Result Date: 12/26/2021  PROCEDURE:  US KIDNEYS (VKD=[id number])  CO Normal. HYDRONEPHROSIS:  None. CYSTS/STONES/MASSES:  None. BLADDER:  No bladder wall thickening. Left ureteral jet is identified. Prevoid bladder volume 66 mL. OTHER:  Negative. CONCLUSION:  1. No hydronephrosis.  2. Nonobstructing 9 mm right ventricle: The cavity size was normal. Wall thickness was normal.    Systolic function was normal. The estimated ejection fraction was 60-65%. No regional wall motion abnormalities.  Doppler parameters are consistent    with abnormal left ventricular rel Ascending aorta diameter was 3.6cm. Aortic root was 3.2cm at the sinus of Valsalva. Aortic root: The aortic root was not dilated. Pulmonary artery: The main pulmonary artery was normal-sized. Systemic veins: Inferior vena cava:  The vessel was normally co Tricuspid regurg peak velocity         2.29  m/sec  ---------  Tricuspid peak RV-RA gradient          21    mm Hg  ---------   Systemic veins                         Value        Reference  Estimated CVP                          5     mm Hg  ---------   Ri ureters are grossly unremarkable. The urinary bladder appears unremarkable although it is partly obscured by the bilateral hip prostheses. ADRENALS:  No mass or enlargement. LIVER:  No enlargement, atrophy, abnormal density, or significant focal lesion. (CPT=71045)    Result Date: 12/10/2021  PROCEDURE:  XR CHEST AP PORTABLE  (CPT=71045)  TECHNIQUE:  AP chest radiograph was obtained.   COMPARISON:  [facility] , XR, XR CHEST PA + LAT CHEST (UKK=[id number]), 9/14/2018, 7:52 PM.  [facility] , XR, XR CHEST AP PORTABLE  (CP

## 2021-12-28 NOTE — DISCHARGE PLANNING
NURSING DISCHARGE NOTE    Discharged Home via Wheelchair. Accompanied by RN  Belongings Taken by patient/family. Pt educated about medications, follow up visits. No further questions at this time. Midline left in for outpatient abx treatment.

## 2022-01-12 ENCOUNTER — APPOINTMENT (OUTPATIENT)
Dept: GENERAL RADIOLOGY | Facility: HOSPITAL | Age: 72
DRG: 312 | End: 2022-01-12
Attending: EMERGENCY MEDICINE
Payer: MEDICARE

## 2022-01-12 ENCOUNTER — APPOINTMENT (OUTPATIENT)
Dept: CT IMAGING | Facility: HOSPITAL | Age: 72
DRG: 312 | End: 2022-01-12
Attending: EMERGENCY MEDICINE
Payer: MEDICARE

## 2022-01-12 ENCOUNTER — HOSPITAL ENCOUNTER (INPATIENT)
Facility: HOSPITAL | Age: 72
LOS: 1 days | Discharge: HOME | DRG: 312 | End: 2022-01-15
Attending: EMERGENCY MEDICINE | Admitting: INTERNAL MEDICINE
Payer: MEDICARE

## 2022-01-12 DIAGNOSIS — I95.9 HYPOTENSION, UNSPECIFIED HYPOTENSION TYPE: ICD-10-CM

## 2022-01-12 DIAGNOSIS — R55 SYNCOPE, NEAR: Primary | ICD-10-CM

## 2022-01-12 DIAGNOSIS — N30.01 ACUTE CYSTITIS WITH HEMATURIA: ICD-10-CM

## 2022-01-12 PROBLEM — N17.9 ACUTE KIDNEY INJURY (HCC): Status: ACTIVE | Noted: 2022-01-12

## 2022-01-12 PROBLEM — N17.9 ACUTE KIDNEY INJURY: Status: ACTIVE | Noted: 2022-01-12

## 2022-01-12 PROBLEM — E87.6 HYPOKALEMIA: Status: ACTIVE | Noted: 2022-01-12

## 2022-01-12 PROBLEM — D64.9 ANEMIA: Status: ACTIVE | Noted: 2022-01-12

## 2022-01-12 LAB
ALBUMIN SERPL-MCNC: 2.6 G/DL (ref 3.4–5)
ALBUMIN/GLOB SERPL: 0.6 {RATIO} (ref 1–2)
ALP LIVER SERPL-CCNC: 142 U/L
ALT SERPL-CCNC: 12 U/L
ANION GAP SERPL CALC-SCNC: 9 MMOL/L (ref 0–18)
APTT PPP: 42.1 SECONDS (ref 23.3–35.6)
AST SERPL-CCNC: 21 U/L (ref 15–37)
ATRIAL RATE: 102 BPM
BASOPHILS # BLD AUTO: 0.08 X10(3) UL (ref 0–0.2)
BASOPHILS NFR BLD AUTO: 1.1 %
BILIRUB SERPL-MCNC: 0.6 MG/DL (ref 0.1–2)
BILIRUB UR QL STRIP.AUTO: NEGATIVE
BUN BLD-MCNC: 18 MG/DL (ref 7–18)
CALCIUM BLD-MCNC: 9 MG/DL (ref 8.5–10.1)
CHLORIDE SERPL-SCNC: 104 MMOL/L (ref 98–112)
CO2 SERPL-SCNC: 24 MMOL/L (ref 21–32)
CREAT BLD-MCNC: 1.34 MG/DL
EOSINOPHIL # BLD AUTO: 0.23 X10(3) UL (ref 0–0.7)
EOSINOPHIL NFR BLD AUTO: 3 %
ERYTHROCYTE [DISTWIDTH] IN BLOOD BY AUTOMATED COUNT: 18.4 %
GLOBULIN PLAS-MCNC: 4.5 G/DL (ref 2.8–4.4)
GLUCOSE BLD-MCNC: 144 MG/DL (ref 70–99)
GLUCOSE BLD-MCNC: 152 MG/DL (ref 70–99)
GLUCOSE BLD-MCNC: 157 MG/DL (ref 70–99)
GLUCOSE UR STRIP.AUTO-MCNC: 50 MG/DL
HCT VFR BLD AUTO: 33.3 %
HGB BLD-MCNC: 10.6 G/DL
HYALINE CASTS #/AREA URNS AUTO: PRESENT /LPF
IMM GRANULOCYTES # BLD AUTO: 0.02 X10(3) UL (ref 0–1)
IMM GRANULOCYTES NFR BLD: 0.3 %
INR BLD: 1.54 (ref 0.8–1.2)
KETONES UR STRIP.AUTO-MCNC: 20 MG/DL
LACTATE SERPL-SCNC: 0.8 MMOL/L (ref 0.4–2)
LACTATE SERPL-SCNC: 2.4 MMOL/L (ref 0.4–2)
LYMPHOCYTES # BLD AUTO: 1.15 X10(3) UL (ref 1–4)
LYMPHOCYTES NFR BLD AUTO: 15.2 %
MCH RBC QN AUTO: 28.2 PG (ref 26–34)
MCHC RBC AUTO-ENTMCNC: 31.8 G/DL (ref 31–37)
MCV RBC AUTO: 88.6 FL
MONOCYTES # BLD AUTO: 0.49 X10(3) UL (ref 0.1–1)
MONOCYTES NFR BLD AUTO: 6.5 %
NEUTROPHILS # BLD AUTO: 5.58 X10 (3) UL (ref 1.5–7.7)
NEUTROPHILS # BLD AUTO: 5.58 X10(3) UL (ref 1.5–7.7)
NEUTROPHILS NFR BLD AUTO: 73.9 %
NITRITE UR QL STRIP.AUTO: NEGATIVE
OSMOLALITY SERPL CALC.SUM OF ELEC: 289 MOSM/KG (ref 275–295)
P AXIS: 54 DEGREES
P-R INTERVAL: 128 MS
PH UR STRIP.AUTO: 5 [PH] (ref 5–8)
PLATELET # BLD AUTO: 443 10(3)UL (ref 150–450)
POTASSIUM SERPL-SCNC: 3.4 MMOL/L (ref 3.5–5.1)
POTASSIUM SERPL-SCNC: 4.7 MMOL/L (ref 3.5–5.1)
PROCALCITONIN SERPL-MCNC: 0.25 NG/ML (ref ?–0.16)
PROT SERPL-MCNC: 7.1 G/DL (ref 6.4–8.2)
PROT UR STRIP.AUTO-MCNC: 100 MG/DL
PROTHROMBIN TIME: 18.5 SECONDS (ref 11.6–14.8)
Q-T INTERVAL: 342 MS
QRS DURATION: 80 MS
QTC CALCULATION (BEZET): 445 MS
R AXIS: 7 DEGREES
RBC # BLD AUTO: 3.76 X10(6)UL
RBC UR QL AUTO: NEGATIVE
SARS-COV-2 RNA RESP QL NAA+PROBE: NOT DETECTED
SODIUM SERPL-SCNC: 137 MMOL/L (ref 136–145)
SP GR UR STRIP.AUTO: 1.02 (ref 1–1.03)
T AXIS: 81 DEGREES
TROPONIN I HIGH SENSITIVITY: 37 NG/L
UROBILINOGEN UR STRIP.AUTO-MCNC: <2 MG/DL
VENTRICULAR RATE: 102 BPM
WBC # BLD AUTO: 7.6 X10(3) UL (ref 4–11)
WBC #/AREA URNS AUTO: >50 /HPF
WBC CLUMPS UR QL AUTO: PRESENT /HPF

## 2022-01-12 PROCEDURE — 83605 ASSAY OF LACTIC ACID: CPT | Performed by: EMERGENCY MEDICINE

## 2022-01-12 PROCEDURE — 85730 THROMBOPLASTIN TIME PARTIAL: CPT | Performed by: EMERGENCY MEDICINE

## 2022-01-12 PROCEDURE — 87086 URINE CULTURE/COLONY COUNT: CPT | Performed by: EMERGENCY MEDICINE

## 2022-01-12 PROCEDURE — 71045 X-RAY EXAM CHEST 1 VIEW: CPT | Performed by: EMERGENCY MEDICINE

## 2022-01-12 PROCEDURE — 82962 GLUCOSE BLOOD TEST: CPT

## 2022-01-12 PROCEDURE — 80053 COMPREHEN METABOLIC PANEL: CPT | Performed by: EMERGENCY MEDICINE

## 2022-01-12 PROCEDURE — 96365 THER/PROPH/DIAG IV INF INIT: CPT

## 2022-01-12 PROCEDURE — 85025 COMPLETE CBC W/AUTO DIFF WBC: CPT | Performed by: EMERGENCY MEDICINE

## 2022-01-12 PROCEDURE — 96375 TX/PRO/DX INJ NEW DRUG ADDON: CPT

## 2022-01-12 PROCEDURE — 84484 ASSAY OF TROPONIN QUANT: CPT | Performed by: EMERGENCY MEDICINE

## 2022-01-12 PROCEDURE — 93010 ELECTROCARDIOGRAM REPORT: CPT

## 2022-01-12 PROCEDURE — 93005 ELECTROCARDIOGRAM TRACING: CPT

## 2022-01-12 PROCEDURE — 99285 EMERGENCY DEPT VISIT HI MDM: CPT

## 2022-01-12 PROCEDURE — 85610 PROTHROMBIN TIME: CPT | Performed by: EMERGENCY MEDICINE

## 2022-01-12 PROCEDURE — 84132 ASSAY OF SERUM POTASSIUM: CPT | Performed by: INTERNAL MEDICINE

## 2022-01-12 PROCEDURE — 87040 BLOOD CULTURE FOR BACTERIA: CPT | Performed by: EMERGENCY MEDICINE

## 2022-01-12 PROCEDURE — 81001 URINALYSIS AUTO W/SCOPE: CPT | Performed by: EMERGENCY MEDICINE

## 2022-01-12 PROCEDURE — 36415 COLL VENOUS BLD VENIPUNCTURE: CPT

## 2022-01-12 PROCEDURE — 70450 CT HEAD/BRAIN W/O DYE: CPT | Performed by: EMERGENCY MEDICINE

## 2022-01-12 PROCEDURE — 96361 HYDRATE IV INFUSION ADD-ON: CPT

## 2022-01-12 PROCEDURE — 84145 PROCALCITONIN (PCT): CPT | Performed by: EMERGENCY MEDICINE

## 2022-01-12 RX ORDER — ATORVASTATIN CALCIUM 40 MG/1
40 TABLET, FILM COATED ORAL DAILY
Status: DISCONTINUED | OUTPATIENT
Start: 2022-01-13 | End: 2022-01-15

## 2022-01-12 RX ORDER — HEPARIN SODIUM 5000 [USP'U]/ML
5000 INJECTION, SOLUTION INTRAVENOUS; SUBCUTANEOUS EVERY 8 HOURS SCHEDULED
Status: DISCONTINUED | OUTPATIENT
Start: 2022-01-12 | End: 2022-01-15

## 2022-01-12 RX ORDER — FLUTICASONE PROPIONATE 50 MCG
2 SPRAY, SUSPENSION (ML) NASAL
Status: DISCONTINUED | OUTPATIENT
Start: 2022-01-12 | End: 2022-01-15

## 2022-01-12 RX ORDER — MIRABEGRON 50 MG/1
50 TABLET, FILM COATED, EXTENDED RELEASE ORAL DAILY
Status: ON HOLD | COMMUNITY
End: 2022-01-12

## 2022-01-12 RX ORDER — PREGABALIN 50 MG/1
50 CAPSULE ORAL DAILY
Status: DISCONTINUED | OUTPATIENT
Start: 2022-01-12 | End: 2022-01-15

## 2022-01-12 RX ORDER — SODIUM CHLORIDE 9 MG/ML
INJECTION, SOLUTION INTRAVENOUS CONTINUOUS
Status: DISCONTINUED | OUTPATIENT
Start: 2022-01-12 | End: 2022-01-13

## 2022-01-12 RX ORDER — SODIUM CHLORIDE 9 MG/ML
1000 INJECTION, SOLUTION INTRAVENOUS ONCE
Status: COMPLETED | OUTPATIENT
Start: 2022-01-12 | End: 2022-01-12

## 2022-01-12 RX ORDER — PREDNISONE 1 MG/1
5 TABLET ORAL
Status: DISCONTINUED | OUTPATIENT
Start: 2022-01-14 | End: 2022-01-15

## 2022-01-12 RX ORDER — PANTOPRAZOLE SODIUM 40 MG/1
40 TABLET, DELAYED RELEASE ORAL
Refills: 5 | Status: DISCONTINUED | OUTPATIENT
Start: 2022-01-13 | End: 2022-01-15

## 2022-01-12 RX ORDER — ALBUTEROL SULFATE 90 UG/1
2 AEROSOL, METERED RESPIRATORY (INHALATION) EVERY 4 HOURS PRN
Status: DISCONTINUED | OUTPATIENT
Start: 2022-01-12 | End: 2022-01-15

## 2022-01-12 RX ORDER — CLOTRIMAZOLE 10 MG/1
LOZENGE ORAL; TOPICAL
COMMUNITY

## 2022-01-12 RX ORDER — ONDANSETRON 2 MG/ML
4 INJECTION INTRAMUSCULAR; INTRAVENOUS EVERY 6 HOURS PRN
Status: DISCONTINUED | OUTPATIENT
Start: 2022-01-12 | End: 2022-01-15

## 2022-01-12 RX ORDER — MULTIVITAMIN WITH FOLIC ACID 400 MCG
1 TABLET ORAL DAILY
COMMUNITY

## 2022-01-12 RX ORDER — DEXTROSE MONOHYDRATE 25 G/50ML
50 INJECTION, SOLUTION INTRAVENOUS
Status: DISCONTINUED | OUTPATIENT
Start: 2022-01-12 | End: 2022-01-15

## 2022-01-12 RX ORDER — CLOTRIMAZOLE 10 MG/1
10 LOZENGE ORAL; TOPICAL
Status: DISCONTINUED | OUTPATIENT
Start: 2022-01-12 | End: 2022-01-12

## 2022-01-12 RX ORDER — METOCLOPRAMIDE HYDROCHLORIDE 5 MG/ML
5 INJECTION INTRAMUSCULAR; INTRAVENOUS EVERY 8 HOURS PRN
Status: DISCONTINUED | OUTPATIENT
Start: 2022-01-12 | End: 2022-01-15

## 2022-01-12 RX ORDER — GARLIC EXTRACT 500 MG
1 CAPSULE ORAL DAILY
Status: DISCONTINUED | OUTPATIENT
Start: 2022-01-12 | End: 2022-01-15

## 2022-01-12 RX ORDER — SODIUM CHLORIDE 9 MG/ML
INJECTION, SOLUTION INTRAVENOUS CONTINUOUS
Status: ACTIVE | OUTPATIENT
Start: 2022-01-12 | End: 2022-01-12

## 2022-01-12 RX ORDER — ALPRAZOLAM 0.25 MG/1
0.25 TABLET ORAL EVERY 6 HOURS PRN
Status: DISCONTINUED | OUTPATIENT
Start: 2022-01-12 | End: 2022-01-15

## 2022-01-12 RX ORDER — POTASSIUM CHLORIDE 20 MEQ/1
20 TABLET, EXTENDED RELEASE ORAL ONCE
Status: COMPLETED | OUTPATIENT
Start: 2022-01-12 | End: 2022-01-12

## 2022-01-12 RX ORDER — ALPRAZOLAM 0.25 MG/1
0.25 TABLET ORAL EVERY 6 HOURS PRN
COMMUNITY

## 2022-01-12 RX ORDER — ACETAMINOPHEN 325 MG/1
650 TABLET ORAL EVERY 6 HOURS PRN
Status: DISCONTINUED | OUTPATIENT
Start: 2022-01-12 | End: 2022-01-15

## 2022-01-12 NOTE — ED INITIAL ASSESSMENT (HPI)
Patient in per EMS from home. C/o increased weakness. Patient baseline mental status. Recent admit for elevated glucose (diabetic) and septic.

## 2022-01-12 NOTE — ED QUICK NOTES
Orders for admission, patient is aware of plan and ready to go upstairs.  Any questions, please call ED RN Katia Ortiz holding     Patient Covid vaccination status: Fully vaccinated     COVID Test Ordered in ED: Rapid SARS-CoV-2 by PCR    COVID Suspicion at Admis

## 2022-01-12 NOTE — ED PROVIDER NOTES
Patient Seen in: [facility] Emergency Department      History   Patient presents with:  Fatigue    Stated Complaint: increased weakness     Subjective:   HPI    Patient is 79-year-old female with medical history as noted below presents emergency nick deep skin biopsy   • CATARACT EXTRACTION W/ INTRAOCULAR LENS IMPLANT Right 04/05/2021     POOJA ZCBOO 12.5 diopter posterior chamber lens serial #[id number]   • JOSE  12/2011, 2020   • Kyler Huizar  12/19/2011    Performed by [facility], [facility]                Social History    Tobacco Use      Smoking status: Former Smoker        Packs/day: 0.15        Years: 1.00        Pack years: .15        Types: Cigarettes        Quit date: 1971        Years since quittin.4      Smokeles normoactive bowel sounds. There is no hernia. EXTREMITIES: There is no cyanosis, clubbing, or edema appreciated. Pulses are 2+ and equal in all 4 extremities. NEURO: Patient is awake, alert and oriented to time place and person.  Motor strength is 5 over DIFFERENTIAL - Abnormal; Notable for the following components:    RBC 3.76 (*)     HGB 10.6 (*)     HCT 33.3 (*)     All other components within normal limits   TROPONIN I HIGH SENSITIVITY - Normal   LACTIC ACID 3 HR POST POSITIVE - Normal   RAPID SARS-COV vessel ischemic disease is noted. No evidence of intracranial hemorrhage or extra-axial fluid collection.    Dictated by (CST): Skyler Moreno MD on 1/12/2022 at 9:30 AM     Finalized by (CST): Skyler Moreno MD on 1/12/2022 at 9:31 AM             X unspecified hypotension type  Acute cystitis with hematuria     Disposition:  Admit  1/12/2022 11:46 am    Follow-up:  No follow-up provider specified.         Medications Prescribed:  Current Discharge Medication List                          Hospital Prob
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
## 2022-01-13 ENCOUNTER — TELEPHONE (OUTPATIENT)
Dept: UROLOGY | Facility: HOSPITAL | Age: 72
End: 2022-01-13

## 2022-01-13 LAB
ANION GAP SERPL CALC-SCNC: 6 MMOL/L (ref 0–18)
BASOPHILS # BLD AUTO: 0.01 X10(3) UL (ref 0–0.2)
BASOPHILS NFR BLD AUTO: 0.2 %
BUN BLD-MCNC: 27 MG/DL (ref 7–18)
CALCIUM BLD-MCNC: 8.1 MG/DL (ref 8.5–10.1)
CHLORIDE SERPL-SCNC: 107 MMOL/L (ref 98–112)
CO2 SERPL-SCNC: 26 MMOL/L (ref 21–32)
CREAT BLD-MCNC: 1.16 MG/DL
EOSINOPHIL # BLD AUTO: 0 X10(3) UL (ref 0–0.7)
EOSINOPHIL NFR BLD AUTO: 0 %
ERYTHROCYTE [DISTWIDTH] IN BLOOD BY AUTOMATED COUNT: 18.2 %
GLUCOSE BLD-MCNC: 105 MG/DL (ref 70–99)
GLUCOSE BLD-MCNC: 127 MG/DL (ref 70–99)
GLUCOSE BLD-MCNC: 130 MG/DL (ref 70–99)
GLUCOSE BLD-MCNC: 130 MG/DL (ref 70–99)
GLUCOSE BLD-MCNC: 145 MG/DL (ref 70–99)
HCT VFR BLD AUTO: 27 %
HGB BLD-MCNC: 8.4 G/DL
IMM GRANULOCYTES # BLD AUTO: 0.01 X10(3) UL (ref 0–1)
IMM GRANULOCYTES NFR BLD: 0.2 %
LYMPHOCYTES # BLD AUTO: 0.83 X10(3) UL (ref 1–4)
LYMPHOCYTES NFR BLD AUTO: 14.5 %
MCH RBC QN AUTO: 28.1 PG (ref 26–34)
MCHC RBC AUTO-ENTMCNC: 31.1 G/DL (ref 31–37)
MCV RBC AUTO: 90.3 FL
MONOCYTES # BLD AUTO: 0.43 X10(3) UL (ref 0.1–1)
MONOCYTES NFR BLD AUTO: 7.5 %
NEUTROPHILS # BLD AUTO: 4.45 X10 (3) UL (ref 1.5–7.7)
NEUTROPHILS # BLD AUTO: 4.45 X10(3) UL (ref 1.5–7.7)
NEUTROPHILS NFR BLD AUTO: 77.6 %
OSMOLALITY SERPL CALC.SUM OF ELEC: 295 MOSM/KG (ref 275–295)
PLATELET # BLD AUTO: 369 10(3)UL (ref 150–450)
POTASSIUM SERPL-SCNC: 3.9 MMOL/L (ref 3.5–5.1)
RBC # BLD AUTO: 2.99 X10(6)UL
SODIUM SERPL-SCNC: 139 MMOL/L (ref 136–145)
WBC # BLD AUTO: 5.7 X10(3) UL (ref 4–11)

## 2022-01-13 PROCEDURE — 80048 BASIC METABOLIC PNL TOTAL CA: CPT | Performed by: INTERNAL MEDICINE

## 2022-01-13 PROCEDURE — 97165 OT EVAL LOW COMPLEX 30 MIN: CPT

## 2022-01-13 PROCEDURE — 97535 SELF CARE MNGMENT TRAINING: CPT

## 2022-01-13 PROCEDURE — 82962 GLUCOSE BLOOD TEST: CPT

## 2022-01-13 PROCEDURE — 97162 PT EVAL MOD COMPLEX 30 MIN: CPT

## 2022-01-13 PROCEDURE — 97116 GAIT TRAINING THERAPY: CPT

## 2022-01-13 PROCEDURE — 85025 COMPLETE CBC W/AUTO DIFF WBC: CPT | Performed by: INTERNAL MEDICINE

## 2022-01-13 RX ORDER — DOXEPIN HYDROCHLORIDE 50 MG/1
1 CAPSULE ORAL DAILY
Status: DISCONTINUED | OUTPATIENT
Start: 2022-01-13 | End: 2022-01-15

## 2022-01-13 NOTE — PLAN OF CARE
Received patient at [address] [place] [address] comfortable in bed, no complaints of pain  Continue isolation for ESBL in urine, UTI, with history of kidney stones  Continue hydration and IVAB

## 2022-01-13 NOTE — PLAN OF CARE
I assumed care of this patient at 0730. Vitals are stable, pt is AAOx4, RA, SR, and afebrile. Pt denies any pain or needs at this time. Call light is within reach.

## 2022-01-13 NOTE — TELEPHONE ENCOUNTER
attempted to phone pt for a telephone visit with May. Spoke with pt  who informed me of his wife's hospitalization. Advised pt  to have her schedule a f/up visit.

## 2022-01-13 NOTE — HOME CARE LIAISON
Received referral via [facility] for Home Health services. Spoke w/ patient and provided with list of [facility] providers from [facility], patient choice is [facility].  Agency reserved in [facility] and contact information placed on AVS.Financial interest disclosu

## 2022-01-13 NOTE — PLAN OF CARE
NURSING ADMISSION NOTE      Patient admitted via Cart  Oriented to room. Safety precautions initiated. Bed in low position. Call light in reach.     Admission Navigator complete  A/Ox4, currently at [facility], NSR/ST  Denies pain  IV fluids and IV abx  PT/OT

## 2022-01-13 NOTE — RESPIRATORY THERAPY NOTE
Patient seen for a Respiratory Care Evaluation for HENRI. Patient states that she has a cpap at home, but doesn't use it. She wishes not to wear a cpap during this admission. On HENRI protocol.

## 2022-01-13 NOTE — PHYSICAL THERAPY NOTE
PHYSICAL THERAPY EVALUATION - INPATIENT     Room Number: 7093/7971-P  Evaluation Date: 1/13/2022  Type of Evaluation: Initial  Physician Order: PT Eval and Treat    Presenting Problem: syncope  Co-Morbidities : HENRI, RA, anxiety, per pr hx of 3 THR an benefit from HHPT/OT. Based on this evaluation, patient's clinical presentation is evolving and overall the evaluation complexity is considered moderate. DISCHARGE RECOMMENDATIONS  PT Discharge Recommendations: Home with home health PT/OT; Intermittent MOTION AND STRENGTH ASSESSMENT  Upper extremity ROM and strength are within functional limits     Lower extremity ROM is within functional limits     Lower extremity strength is within functional limits       BALANCE  Static Sitting: Good  Dynamic Sitting: training  Strengthening  Transfer training    Patient End of Session: Up in chair;Needs met;Call light within reach;RN aware of session/findings; All patient questions and concerns addressed; Alarm set; Discussed recommendations with /social worke

## 2022-01-13 NOTE — OCCUPATIONAL THERAPY NOTE
OCCUPATIONAL THERAPY EVALUATION - INPATIENT     Room Number: 1155/0823-B  Evaluation Date: 1/13/2022  Type of Evaluation: Initial  Presenting Problem: ESBL, near syncope    Physician Order: IP Consult to Occupational Therapy  Reason for Therapy: ADL/IADL D balance while pt stood and pulled brief up to waist  Toileting: independent hygiene care, CGA balance while pulling pull-up brief.      Functional Mobility:  Supine to Sit : Contact guard assist  Sit to Stand: Contact guard assist  Toilet/commode transfer: COGNITION  Overall Cognitive Status:  WFL - within functional limits    ASSESSMENTS    Upper Extremity:   ROM: within functional limits   Strength: is within functional limits     AM-PAC ‘6-Clicks’ Inpatient Daily Activity Short Form  -   Putting on an

## 2022-01-13 NOTE — PROGRESS NOTES
[facility] Hospitalist Progress Note     Marianna Luke [facility] Patient Status:  Observation    1950 MRN [id number]   [facility] 7NE-A Attending Laura Beach MD   Hosp Day # 0 PCP Sonia Kenney DO     Chief Complaint: Patient pre input(s): TROP, PBNP in the last 168 hours. Creatinine Kinase  No results for input(s): CK in the last 168 hours. Inflammatory Markers  No results for input(s): CRP, PEYMAN, LDH, DDIMER in the last 168 hours. Imaging: Imaging data reviewed in Epic. arrangement of outpatient infusions     Christ Reyes MD  [address] [place] [address]

## 2022-01-13 NOTE — CM/SW NOTE
Sw notified of PT recommendations for Home Health. Referral started. Pt  recently dc from hospital with iv abx with [facility]. SW/CM following.      Corky 106, LSW

## 2022-01-14 PROBLEM — Z16.12 ESBL (EXTENDED SPECTRUM BETA-LACTAMASE) PRODUCING BACTERIA INFECTION: Status: ACTIVE | Noted: 2022-01-14

## 2022-01-14 PROBLEM — A49.9 ESBL (EXTENDED SPECTRUM BETA-LACTAMASE) PRODUCING BACTERIA INFECTION: Status: ACTIVE | Noted: 2022-01-14

## 2022-01-14 LAB
GLUCOSE BLD-MCNC: 107 MG/DL (ref 70–99)
GLUCOSE BLD-MCNC: 125 MG/DL (ref 70–99)
GLUCOSE BLD-MCNC: 128 MG/DL (ref 70–99)
GLUCOSE BLD-MCNC: 99 MG/DL (ref 70–99)

## 2022-01-14 PROCEDURE — 82962 GLUCOSE BLOOD TEST: CPT

## 2022-01-14 NOTE — CONSULTS
INFECTIOUS DISEASE CONSULTATION    Joie Joshi [facility] Patient Status:  Observation    1950 MRN [id number]   [facility] 7NE-A Attending India Spicer MD   Hosp Day # 0 PCP Silas Bhatt DO 5/25/2016    Procedure: HIP INJECTION;  Surgeon: Debi Boas, DO;  Location: [address] [place] [address]   • FOOT SURGERY     • HIP REPLACEMENT SURGERY Bilateral 06/2017    R 2017, L 2018   • HIP REPLACEMENT SURGERY  last 5/2020    right hip redone   • alcohol use of about 1.0 standard drink of alcohol per week. She reports that she does not use drugs.       Allergies:    Peanut Oil              OTHER (SEE COMMENTS)    Comment:Pine nuts - Canker Sores in mouth             Pine nuts - Canker Sores in mouth current facility-administered medications on file prior to encounter.   clotrimazole 10 MG Mouth/Throat Aurelia, Take 1 lozenge (10 mg total) by mouth 5 (five) times daily PRN thrush, Disp: , Rfl:   ALPRAZolam 0.25 MG Oral Tab, Take 0.25 mg by mouth every 6 Oral Cap, Take 1 capsule by mouth daily. , Disp: , Rfl:   Fexofenadine HCl 180 MG Oral Tab, Take 180 mg by mouth daily. , Disp: , Rfl:         Review of Systems:    A comprehensive 10 point review of systems was completed.   Pertinent positives and negatives Culture Result No Growth 2 Days N/A       Established Problem list:  Patient Active Problem List:     Rheumatoid arthritis involving multiple sites with positive rheumatoid factor (HCC)     Reactive airway disease without complication     HENRI (obstructive

## 2022-01-14 NOTE — PLAN OF CARE
Zosyn Q8 administration times will need to be readjusted to 2200/0600/1400/2200 due to loss of IV access during day on 1/13.  Note sent to pharmacy

## 2022-01-14 NOTE — PLAN OF CARE
Sent a message to Dr. Marilee Naqvi to let her know patient does not have IV access. ID and PICC were consulted today per Dr. Maribell Ponce but didn't make it to the floor and there's not a note that I can see from either.

## 2022-01-14 NOTE — PLAN OF CARE
I assumed care of this patient at 0730. VSS, RA, SR, afebrile, and denies pain. AAOx4. Call light within reach.

## 2022-01-14 NOTE — PHYSICAL THERAPY NOTE
PHYSICAL THERAPY TREATMENT NOTE - INPATIENT    Room Number: 8584/7558-Z     Session: 1     Number of Visits to Meet Established Goals: 5    Presenting Problem: syncope  Co-Morbidities : HENRI, RA, anxiety, per pr hx of 3 THR and multiple lumbar surgeries assist device: walker - rolling at assistance level: modified independent      Goal #4     Goal #5     Goal #6     Goal Comments: Goals established on 1/13/2022 1/14/2022 all goals ongoing.       SUBJECTIVE    \" I think I am better off going home radha tested)  Ascend and Descend :  (not tested)    Skilled Therapy Provided    Bed Mobility:  Rolling right: sup   Rolling left: sup    Supine<>Sit: min assist   Sit<>Supine: NT     Transfer Mobility:  Sit<>Stand: sup   Stand<>Sit: sup   Gait: 48' RW and cga.

## 2022-01-14 NOTE — CM/SW NOTE
[facility] accepted pt for [facility]. Pt was current with [facility] for IV ABX - waiting for final blood cultures to see if pt needs to continue with her IV ABX at [facility].

## 2022-01-14 NOTE — PLAN OF CARE
Received patient at 1930  Patient without IV access for last 12 hours, MD aware per day shift nurse. New line placed to left wrist 24g for IVAB.

## 2022-01-15 VITALS
HEART RATE: 93 BPM | TEMPERATURE: 98 F | RESPIRATION RATE: 16 BRPM | DIASTOLIC BLOOD PRESSURE: 76 MMHG | SYSTOLIC BLOOD PRESSURE: 150 MMHG | BODY MASS INDEX: 29 KG/M2 | OXYGEN SATURATION: 96 % | WEIGHT: 196.88 LBS

## 2022-01-15 LAB — GLUCOSE BLD-MCNC: 108 MG/DL (ref 70–99)

## 2022-01-15 PROCEDURE — 82962 GLUCOSE BLOOD TEST: CPT

## 2022-01-15 RX ORDER — MIRABEGRON 50 MG/1
50 TABLET, FILM COATED, EXTENDED RELEASE ORAL DAILY
Qty: 30 TABLET | Refills: 0 | Status: SHIPPED | OUTPATIENT
Start: 2022-01-15

## 2022-01-15 RX ORDER — PREDNISONE 2.5 MG
TABLET ORAL
Qty: 42 TABLET | Refills: 0 | Status: SHIPPED | OUTPATIENT
Start: 2022-01-15 | End: 2022-01-15

## 2022-01-15 RX ORDER — PREDNISONE 2.5 MG
TABLET ORAL
Qty: 42 TABLET | Refills: 0 | Status: SHIPPED | COMMUNITY
Start: 2022-01-15

## 2022-01-15 RX ORDER — RAMIPRIL 10 MG/1
10 CAPSULE ORAL EVERY EVENING
Qty: 180 CAPSULE | Refills: 1 | Status: SHIPPED | COMMUNITY
Start: 2022-01-15

## 2022-01-15 NOTE — DISCHARGE SUMMARY
General Medicine Discharge Summary     Patient ID:  Juan Jose More  67year old  1/6/1950    Admit date: 1/12/2022    Discharge date and time:     Attending Physician: Mikaela Cornelius MD     Primary Care P breakfast.    leflunomide 20 MG Oral Tab  Take 20 mg by mouth daily. linagliptin (TRADJENTA) 5 mg Oral Tab  Take 1 tablet (5 mg total) by mouth daily. atorvastatin 40 MG Oral Tab  Take 1 tablet (40 mg total) by mouth daily.     ondansetron (ZOFRAN) 4 MD  [facility] Hospitalist  [phone]

## 2022-01-15 NOTE — PLAN OF CARE
Pt discharged home via wheelchair.  met patient at [facility] to transfer home via car. Discharge instructions reviewed. All questions answered. IV removed. [facility] informed of discharge. Per ID, no home IV ABT needed.        Problem: Diabetes/Gluco

## 2022-01-15 NOTE — PLAN OF CARE
Assumed care at 1930   A&Ox4   Tele: NS   Up to bathroom with standby   C/o headache, gave tylenol with relief       POC discussed with patient

## 2022-01-25 PROBLEM — R73.9 HYPERGLYCEMIA: Status: RESOLVED | Noted: 2021-12-11 | Resolved: 2022-01-25

## 2022-01-25 PROBLEM — E11.65 HYPEROSMOLAR HYPERGLYCEMIC STATE (HHS) (HCC): Status: RESOLVED | Noted: 2021-12-10 | Resolved: 2022-01-25

## 2022-01-25 PROBLEM — E11.00 HYPEROSMOLAR HYPERGLYCEMIC STATE (HHS) (HCC): Status: RESOLVED | Noted: 2021-12-10 | Resolved: 2022-01-25

## 2022-01-25 PROBLEM — I95.9 HYPOTENSION, UNSPECIFIED HYPOTENSION TYPE: Status: RESOLVED | Noted: 2021-12-25 | Resolved: 2022-01-25

## 2022-01-25 PROBLEM — E87.6 HYPOKALEMIA: Status: RESOLVED | Noted: 2022-01-12 | Resolved: 2022-01-25

## 2022-01-25 PROBLEM — N39.0 URINARY TRACT INFECTION WITHOUT HEMATURIA, SITE UNSPECIFIED: Status: RESOLVED | Noted: 2021-12-11 | Resolved: 2022-01-25

## 2022-01-25 PROBLEM — I95.9 HYPOTENSION: Status: RESOLVED | Noted: 2021-12-25 | Resolved: 2022-01-25

## 2022-01-25 PROBLEM — E87.1 HYPONATREMIA: Status: RESOLVED | Noted: 2021-12-25 | Resolved: 2022-01-25

## 2022-02-24 ENCOUNTER — HOSPITAL ENCOUNTER (EMERGENCY)
Facility: HOSPITAL | Age: 72
Discharge: HOME | End: 2022-02-24
Attending: EMERGENCY MEDICINE
Payer: MEDICARE

## 2022-02-24 VITALS
SYSTOLIC BLOOD PRESSURE: 117 MMHG | TEMPERATURE: 98 F | HEIGHT: 69 IN | WEIGHT: 187 LBS | OXYGEN SATURATION: 94 % | DIASTOLIC BLOOD PRESSURE: 60 MMHG | HEART RATE: 78 BPM | BODY MASS INDEX: 27.7 KG/M2 | RESPIRATION RATE: 19 BRPM

## 2022-02-24 DIAGNOSIS — H81.10 BENIGN PAROXYSMAL POSITIONAL VERTIGO, UNSPECIFIED LATERALITY: Primary | ICD-10-CM

## 2022-02-24 DIAGNOSIS — D64.9 ANEMIA, UNSPECIFIED TYPE: ICD-10-CM

## 2022-02-24 LAB
ALBUMIN SERPL-MCNC: 2.6 G/DL (ref 3.4–5)
ALBUMIN/GLOB SERPL: 0.6 {RATIO} (ref 1–2)
ALP LIVER SERPL-CCNC: 199 U/L
ALT SERPL-CCNC: 12 U/L
ANION GAP SERPL CALC-SCNC: 7 MMOL/L (ref 0–18)
AST SERPL-CCNC: 21 U/L (ref 15–37)
ATRIAL RATE: 89 BPM
BASOPHILS # BLD AUTO: 0.07 X10(3) UL (ref 0–0.2)
BILIRUB SERPL-MCNC: 0.3 MG/DL (ref 0.1–2)
BUN BLD-MCNC: 12 MG/DL (ref 7–18)
CALCIUM BLD-MCNC: 8.7 MG/DL (ref 8.5–10.1)
CHLORIDE SERPL-SCNC: 108 MMOL/L (ref 98–112)
CO2 SERPL-SCNC: 24 MMOL/L (ref 21–32)
CREAT BLD-MCNC: 0.99 MG/DL
DEPRECATED HBV CORE AB SER IA-ACNC: 286.8 NG/ML
EOSINOPHIL # BLD AUTO: 0.24 X10(3) UL (ref 0–0.7)
EOSINOPHIL NFR BLD AUTO: 2.5 %
ERYTHROCYTE [DISTWIDTH] IN BLOOD BY AUTOMATED COUNT: 19.5 %
GLOBULIN PLAS-MCNC: 4.4 G/DL (ref 2.8–4.4)
GLUCOSE BLD-MCNC: 132 MG/DL (ref 70–99)
GLUCOSE BLD-MCNC: 145 MG/DL (ref 70–99)
HCT VFR BLD AUTO: 29.6 %
HGB BLD-MCNC: 9.2 G/DL
IMM GRANULOCYTES # BLD AUTO: 0.04 X10(3) UL (ref 0–1)
IMM GRANULOCYTES NFR BLD: 0.4 %
IRON SATN MFR SERPL: 9 %
IRON SERPL-MCNC: 19 UG/DL
LYMPHOCYTES # BLD AUTO: 1.24 X10(3) UL (ref 1–4)
LYMPHOCYTES NFR BLD AUTO: 12.7 %
MCH RBC QN AUTO: 26.7 PG (ref 26–34)
MCHC RBC AUTO-ENTMCNC: 31.1 G/DL (ref 31–37)
MCV RBC AUTO: 85.8 FL
MONOCYTES # BLD AUTO: 0.64 X10(3) UL (ref 0.1–1)
MONOCYTES NFR BLD AUTO: 6.6 %
NEUTROPHILS # BLD AUTO: 7.53 X10 (3) UL (ref 1.5–7.7)
NEUTROPHILS # BLD AUTO: 7.53 X10(3) UL (ref 1.5–7.7)
NEUTROPHILS NFR BLD AUTO: 77.1 %
OSMOLALITY SERPL CALC.SUM OF ELEC: 290 MOSM/KG (ref 275–295)
P AXIS: 71 DEGREES
P-R INTERVAL: 166 MS
PLATELET # BLD AUTO: 524 10(3)UL (ref 150–450)
POTASSIUM SERPL-SCNC: 3.5 MMOL/L (ref 3.5–5.1)
PROT SERPL-MCNC: 7 G/DL (ref 6.4–8.2)
Q-T INTERVAL: 368 MS
QRS DURATION: 72 MS
QTC CALCULATION (BEZET): 447 MS
R AXIS: 24 DEGREES
RBC # BLD AUTO: 3.45 X10(6)UL
SODIUM SERPL-SCNC: 139 MMOL/L (ref 136–145)
T AXIS: 79 DEGREES
TIBC SERPL-MCNC: 221 UG/DL (ref 240–450)
TRANSFERRIN SERPL-MCNC: 148 MG/DL (ref 200–360)
TROPONIN I HIGH SENSITIVITY: 8 NG/L
VENTRICULAR RATE: 89 BPM

## 2022-02-24 PROCEDURE — 99284 EMERGENCY DEPT VISIT MOD MDM: CPT

## 2022-02-24 PROCEDURE — 82272 OCCULT BLD FECES 1-3 TESTS: CPT

## 2022-02-24 PROCEDURE — 85025 COMPLETE CBC W/AUTO DIFF WBC: CPT | Performed by: EMERGENCY MEDICINE

## 2022-02-24 PROCEDURE — 36415 COLL VENOUS BLD VENIPUNCTURE: CPT

## 2022-02-24 PROCEDURE — 85025 COMPLETE CBC W/AUTO DIFF WBC: CPT

## 2022-02-24 PROCEDURE — 93005 ELECTROCARDIOGRAM TRACING: CPT

## 2022-02-24 PROCEDURE — 83540 ASSAY OF IRON: CPT | Performed by: EMERGENCY MEDICINE

## 2022-02-24 PROCEDURE — 83550 IRON BINDING TEST: CPT | Performed by: EMERGENCY MEDICINE

## 2022-02-24 PROCEDURE — 82728 ASSAY OF FERRITIN: CPT | Performed by: EMERGENCY MEDICINE

## 2022-02-24 PROCEDURE — 82962 GLUCOSE BLOOD TEST: CPT

## 2022-02-24 PROCEDURE — 84484 ASSAY OF TROPONIN QUANT: CPT

## 2022-02-24 PROCEDURE — 93010 ELECTROCARDIOGRAM REPORT: CPT

## 2022-02-24 PROCEDURE — 80053 COMPREHEN METABOLIC PANEL: CPT

## 2022-02-24 PROCEDURE — 80053 COMPREHEN METABOLIC PANEL: CPT | Performed by: EMERGENCY MEDICINE

## 2022-02-24 PROCEDURE — 84484 ASSAY OF TROPONIN QUANT: CPT | Performed by: EMERGENCY MEDICINE

## 2022-02-24 RX ORDER — MECLIZINE HYDROCHLORIDE 25 MG/1
25 TABLET ORAL ONCE
Status: COMPLETED | OUTPATIENT
Start: 2022-02-24 | End: 2022-02-24

## 2022-02-24 NOTE — ED INITIAL ASSESSMENT (HPI)
Pt was at her doctors office and started to feel like the room was spinning and got dizzy. Vomited x1. Felt better after she was given orange juice and felt better.

## 2022-02-24 NOTE — ED QUICK NOTES
Pt sitting on a bedside commode in an attempt to give urine sample.  Instructed abd verbalizing understanding

## 2022-02-24 NOTE — ED QUICK NOTES
Pt reevaluated by er physician.  Informed of all her test reports and plan of care, verbalizing understanding

## 2022-02-25 PROBLEM — N30.01 ACUTE CYSTITIS WITH HEMATURIA: Status: RESOLVED | Noted: 2022-01-12 | Resolved: 2022-02-25

## 2022-02-25 PROBLEM — Z16.12 ESBL (EXTENDED SPECTRUM BETA-LACTAMASE) PRODUCING BACTERIA INFECTION: Status: RESOLVED | Noted: 2022-01-14 | Resolved: 2022-02-25

## 2022-02-25 PROBLEM — N18.9 ACUTE RENAL FAILURE SUPERIMPOSED ON CHRONIC KIDNEY DISEASE, UNSPECIFIED CKD STAGE, UNSPECIFIED ACUTE RENAL FAILURE TYPE (HCC): Status: RESOLVED | Noted: 2021-12-25 | Resolved: 2022-02-25

## 2022-02-25 PROBLEM — N17.9 ACUTE RENAL FAILURE SUPERIMPOSED ON CHRONIC KIDNEY DISEASE, UNSPECIFIED CKD STAGE, UNSPECIFIED ACUTE RENAL FAILURE TYPE: Status: RESOLVED | Noted: 2021-12-25 | Resolved: 2022-02-25

## 2022-02-25 PROBLEM — N17.9 ACUTE RENAL FAILURE SUPERIMPOSED ON CHRONIC KIDNEY DISEASE, UNSPECIFIED CKD STAGE, UNSPECIFIED ACUTE RENAL FAILURE TYPE (HCC): Status: RESOLVED | Noted: 2021-12-25 | Resolved: 2022-02-25

## 2022-02-25 PROBLEM — N17.9 ACUTE KIDNEY INJURY (HCC): Status: RESOLVED | Noted: 2022-01-12 | Resolved: 2022-02-25

## 2022-02-25 PROBLEM — A49.9 ESBL (EXTENDED SPECTRUM BETA-LACTAMASE) PRODUCING BACTERIA INFECTION: Status: RESOLVED | Noted: 2022-01-14 | Resolved: 2022-02-25

## 2022-02-25 PROBLEM — N17.9 ACUTE KIDNEY INJURY: Status: RESOLVED | Noted: 2022-01-12 | Resolved: 2022-02-25

## 2022-02-25 PROBLEM — N18.9 ACUTE RENAL FAILURE SUPERIMPOSED ON CHRONIC KIDNEY DISEASE, UNSPECIFIED CKD STAGE, UNSPECIFIED ACUTE RENAL FAILURE TYPE: Status: RESOLVED | Noted: 2021-12-25 | Resolved: 2022-02-25

## 2022-03-08 ENCOUNTER — OFFICE VISIT (OUTPATIENT)
Dept: UROLOGY | Facility: CLINIC | Age: 72
End: 2022-03-08
Attending: OBSTETRICS & GYNECOLOGY
Payer: MEDICARE

## 2022-03-08 VITALS — WEIGHT: 187 LBS | BODY MASS INDEX: 27.7 KG/M2 | HEIGHT: 69 IN | TEMPERATURE: 97 F

## 2022-03-08 DIAGNOSIS — N95.2 POSTMENOPAUSAL ATROPHIC VAGINITIS: ICD-10-CM

## 2022-03-08 DIAGNOSIS — Z98.890 POST-OPERATIVE STATE: ICD-10-CM

## 2022-03-08 DIAGNOSIS — N81.84 PELVIC MUSCLE WASTING: ICD-10-CM

## 2022-03-08 DIAGNOSIS — R35.1 NOCTURIA: Primary | ICD-10-CM

## 2022-03-08 DIAGNOSIS — N39.0 FREQUENT UTI: ICD-10-CM

## 2022-03-08 PROCEDURE — 87077 CULTURE AEROBIC IDENTIFY: CPT | Performed by: OBSTETRICS & GYNECOLOGY

## 2022-03-08 PROCEDURE — 51701 INSERT BLADDER CATHETER: CPT | Performed by: OBSTETRICS & GYNECOLOGY

## 2022-03-08 PROCEDURE — 87186 SC STD MICRODIL/AGAR DIL: CPT | Performed by: OBSTETRICS & GYNECOLOGY

## 2022-03-08 PROCEDURE — 99212 OFFICE O/P EST SF 10 MIN: CPT

## 2022-03-08 PROCEDURE — 87086 URINE CULTURE/COLONY COUNT: CPT | Performed by: OBSTETRICS & GYNECOLOGY

## 2022-03-08 RX ORDER — NITROFURANTOIN MACROCRYSTALS 100 MG/1
100 CAPSULE ORAL NIGHTLY
Qty: 90 CAPSULE | Refills: 0 | Status: SHIPPED | OUTPATIENT
Start: 2022-03-08

## 2022-03-11 ENCOUNTER — TELEPHONE (OUTPATIENT)
Dept: UROLOGY | Facility: CLINIC | Age: 72
End: 2022-03-11

## 2022-03-11 RX ORDER — NITROFURANTOIN 25; 75 MG/1; MG/1
100 CAPSULE ORAL 2 TIMES DAILY
Qty: 20 CAPSULE | Refills: 0 | Status: SHIPPED | OUTPATIENT
Start: 2022-03-11 | End: 2022-03-24

## 2022-03-11 NOTE — TELEPHONE ENCOUNTER
TC to pt to notify of ucx results that will need treatment w/empiric antibiotics. TORB Dr.Jirschele awan 100mg po bid x 10d. Pt inst to stop NTF suppression while taking empiric antibiotics and to restart suppression after finished. Pt states she has no symptoms currently at this time, will call office w/further questions or concerns.

## 2022-03-21 ENCOUNTER — HOSPITAL ENCOUNTER (INPATIENT)
Facility: HOSPITAL | Age: 72
LOS: 3 days | Discharge: HOME HEALTH | End: 2022-03-24
Attending: EMERGENCY MEDICINE | Admitting: INTERNAL MEDICINE
Payer: MEDICARE

## 2022-03-21 DIAGNOSIS — I95.1 ORTHOSTATIC HYPOTENSION: Primary | ICD-10-CM

## 2022-03-21 PROBLEM — R79.89 AZOTEMIA: Status: ACTIVE | Noted: 2022-03-21

## 2022-03-21 PROBLEM — E87.1 HYPONATREMIA: Status: ACTIVE | Noted: 2022-03-21

## 2022-03-21 PROBLEM — R73.9 HYPERGLYCEMIA: Status: ACTIVE | Noted: 2022-03-21

## 2022-03-21 LAB
ALBUMIN SERPL-MCNC: 3.2 G/DL (ref 3.4–5)
ALBUMIN/GLOB SERPL: 0.7 {RATIO} (ref 1–2)
ALP LIVER SERPL-CCNC: 157 U/L
ANION GAP SERPL CALC-SCNC: 6 MMOL/L (ref 0–18)
AST SERPL-CCNC: 21 U/L (ref 15–37)
ATRIAL RATE: 91 BPM
BASOPHILS # BLD AUTO: 0.07 X10(3) UL (ref 0–0.2)
BASOPHILS NFR BLD AUTO: 0.8 %
BILIRUB SERPL-MCNC: 0.4 MG/DL (ref 0.1–2)
BILIRUB UR QL STRIP.AUTO: NEGATIVE
BUN BLD-MCNC: 23 MG/DL (ref 7–18)
CALCIUM BLD-MCNC: 9.4 MG/DL (ref 8.5–10.1)
CHLORIDE SERPL-SCNC: 104 MMOL/L (ref 98–112)
CO2 SERPL-SCNC: 22 MMOL/L (ref 21–32)
COLOR UR AUTO: YELLOW
CREAT BLD-MCNC: 1.14 MG/DL
EOSINOPHIL # BLD AUTO: 0.18 X10(3) UL (ref 0–0.7)
EOSINOPHIL NFR BLD AUTO: 2.1 %
ERYTHROCYTE [DISTWIDTH] IN BLOOD BY AUTOMATED COUNT: 22.8 %
EST. AVERAGE GLUCOSE BLD GHB EST-MCNC: 117 MG/DL (ref 68–126)
GLOBULIN PLAS-MCNC: 4.3 G/DL (ref 2.8–4.4)
GLUCOSE BLD-MCNC: 105 MG/DL (ref 70–99)
GLUCOSE BLD-MCNC: 106 MG/DL (ref 70–99)
GLUCOSE BLD-MCNC: 155 MG/DL (ref 70–99)
GLUCOSE UR STRIP.AUTO-MCNC: NEGATIVE MG/DL
HBA1C MFR BLD: 5.7 % (ref ?–5.7)
HCT VFR BLD AUTO: 35.3 %
HGB BLD-MCNC: 10.9 G/DL
IMM GRANULOCYTES # BLD AUTO: 0.04 X10(3) UL (ref 0–1)
IMM GRANULOCYTES NFR BLD: 0.5 %
KETONES UR STRIP.AUTO-MCNC: NEGATIVE MG/DL
LACTATE SERPL-SCNC: 1.4 MMOL/L (ref 0.4–2)
LYMPHOCYTES # BLD AUTO: 1.6 X10(3) UL (ref 1–4)
LYMPHOCYTES NFR BLD AUTO: 18.6 %
MCH RBC QN AUTO: 27.2 PG (ref 26–34)
MCHC RBC AUTO-ENTMCNC: 30.9 G/DL (ref 31–37)
MCV RBC AUTO: 88 FL
MONOCYTES # BLD AUTO: 0.69 X10(3) UL (ref 0.1–1)
MONOCYTES NFR BLD AUTO: 8 %
NEUTROPHILS # BLD AUTO: 6.02 X10 (3) UL (ref 1.5–7.7)
NEUTROPHILS # BLD AUTO: 6.02 X10(3) UL (ref 1.5–7.7)
NEUTROPHILS NFR BLD AUTO: 70 %
NITRITE UR QL STRIP.AUTO: NEGATIVE
OSMOLALITY SERPL CALC.SUM OF ELEC: 281 MOSM/KG (ref 275–295)
P AXIS: 65 DEGREES
P-R INTERVAL: 168 MS
PH UR STRIP.AUTO: 5 [PH] (ref 5–8)
PLATELET MORPHOLOGY: NORMAL
POTASSIUM SERPL-SCNC: 4.9 MMOL/L (ref 3.5–5.1)
PROT SERPL-MCNC: 7.5 G/DL (ref 6.4–8.2)
PROT UR STRIP.AUTO-MCNC: NEGATIVE MG/DL
Q-T INTERVAL: 340 MS
QRS DURATION: 64 MS
QTC CALCULATION (BEZET): 418 MS
R AXIS: 11 DEGREES
RBC # BLD AUTO: 4.01 X10(6)UL
RBC UR QL AUTO: NEGATIVE
SARS-COV-2 RNA RESP QL NAA+PROBE: NOT DETECTED
SODIUM SERPL-SCNC: 132 MMOL/L (ref 136–145)
SP GR UR STRIP.AUTO: 1.01 (ref 1–1.03)
T AXIS: 79 DEGREES
TROPONIN I HIGH SENSITIVITY: 5 NG/L
TSI SER-ACNC: 2.73 MIU/ML (ref 0.36–3.74)
UROBILINOGEN UR STRIP.AUTO-MCNC: <2 MG/DL
VENTRICULAR RATE: 91 BPM
WBC # BLD AUTO: 8.6 X10(3) UL (ref 4–11)

## 2022-03-21 PROCEDURE — 84484 ASSAY OF TROPONIN QUANT: CPT | Performed by: EMERGENCY MEDICINE

## 2022-03-21 PROCEDURE — 87086 URINE CULTURE/COLONY COUNT: CPT | Performed by: EMERGENCY MEDICINE

## 2022-03-21 PROCEDURE — 83036 HEMOGLOBIN GLYCOSYLATED A1C: CPT | Performed by: INTERNAL MEDICINE

## 2022-03-21 PROCEDURE — 83605 ASSAY OF LACTIC ACID: CPT | Performed by: EMERGENCY MEDICINE

## 2022-03-21 PROCEDURE — 87186 SC STD MICRODIL/AGAR DIL: CPT | Performed by: EMERGENCY MEDICINE

## 2022-03-21 PROCEDURE — 93010 ELECTROCARDIOGRAM REPORT: CPT

## 2022-03-21 PROCEDURE — 82962 GLUCOSE BLOOD TEST: CPT

## 2022-03-21 PROCEDURE — 99285 EMERGENCY DEPT VISIT HI MDM: CPT

## 2022-03-21 PROCEDURE — 36415 COLL VENOUS BLD VENIPUNCTURE: CPT

## 2022-03-21 PROCEDURE — 87040 BLOOD CULTURE FOR BACTERIA: CPT | Performed by: EMERGENCY MEDICINE

## 2022-03-21 PROCEDURE — 80053 COMPREHEN METABOLIC PANEL: CPT | Performed by: EMERGENCY MEDICINE

## 2022-03-21 PROCEDURE — 87077 CULTURE AEROBIC IDENTIFY: CPT | Performed by: EMERGENCY MEDICINE

## 2022-03-21 PROCEDURE — 81001 URINALYSIS AUTO W/SCOPE: CPT | Performed by: EMERGENCY MEDICINE

## 2022-03-21 PROCEDURE — 96360 HYDRATION IV INFUSION INIT: CPT

## 2022-03-21 PROCEDURE — 85025 COMPLETE CBC W/AUTO DIFF WBC: CPT | Performed by: EMERGENCY MEDICINE

## 2022-03-21 PROCEDURE — 84443 ASSAY THYROID STIM HORMONE: CPT | Performed by: INTERNAL MEDICINE

## 2022-03-21 PROCEDURE — 96361 HYDRATE IV INFUSION ADD-ON: CPT

## 2022-03-21 PROCEDURE — 93005 ELECTROCARDIOGRAM TRACING: CPT

## 2022-03-21 RX ORDER — SODIUM CHLORIDE 9 MG/ML
INJECTION, SOLUTION INTRAVENOUS CONTINUOUS
Status: DISCONTINUED | OUTPATIENT
Start: 2022-03-21 | End: 2022-03-24

## 2022-03-21 RX ORDER — ACETAMINOPHEN 325 MG/1
650 TABLET ORAL EVERY 6 HOURS PRN
Status: DISCONTINUED | OUTPATIENT
Start: 2022-03-21 | End: 2022-03-24

## 2022-03-21 RX ORDER — ALPRAZOLAM 0.25 MG/1
0.25 TABLET ORAL EVERY 6 HOURS PRN
Status: DISCONTINUED | OUTPATIENT
Start: 2022-03-21 | End: 2022-03-24

## 2022-03-21 RX ORDER — NICOTINE POLACRILEX 4 MG
30 LOZENGE BUCCAL
Status: DISCONTINUED | OUTPATIENT
Start: 2022-03-21 | End: 2022-03-24

## 2022-03-21 RX ORDER — ATORVASTATIN CALCIUM 40 MG/1
40 TABLET, FILM COATED ORAL NIGHTLY
Status: DISCONTINUED | OUTPATIENT
Start: 2022-03-21 | End: 2022-03-24

## 2022-03-21 RX ORDER — DEXTROSE MONOHYDRATE 25 G/50ML
50 INJECTION, SOLUTION INTRAVENOUS
Status: DISCONTINUED | OUTPATIENT
Start: 2022-03-21 | End: 2022-03-24

## 2022-03-21 RX ORDER — NICOTINE POLACRILEX 4 MG
15 LOZENGE BUCCAL
Status: DISCONTINUED | OUTPATIENT
Start: 2022-03-21 | End: 2022-03-24

## 2022-03-21 RX ORDER — CETIRIZINE HYDROCHLORIDE 10 MG/1
10 TABLET ORAL DAILY
Status: DISCONTINUED | OUTPATIENT
Start: 2022-03-22 | End: 2022-03-24

## 2022-03-21 RX ORDER — LEFLUNOMIDE 20 MG/1
20 TABLET ORAL DAILY
Status: DISCONTINUED | OUTPATIENT
Start: 2022-03-22 | End: 2022-03-24

## 2022-03-21 RX ORDER — METOCLOPRAMIDE HYDROCHLORIDE 5 MG/ML
10 INJECTION INTRAMUSCULAR; INTRAVENOUS EVERY 8 HOURS PRN
Status: DISCONTINUED | OUTPATIENT
Start: 2022-03-21 | End: 2022-03-24

## 2022-03-21 RX ORDER — SODIUM CHLORIDE 9 MG/ML
INJECTION, SOLUTION INTRAVENOUS CONTINUOUS
Status: DISCONTINUED | OUTPATIENT
Start: 2022-03-21 | End: 2022-03-23

## 2022-03-21 RX ORDER — HYDROCODONE BITARTRATE AND ACETAMINOPHEN 5; 325 MG/1; MG/1
1 TABLET ORAL EVERY 4 HOURS PRN
Status: DISCONTINUED | OUTPATIENT
Start: 2022-03-21 | End: 2022-03-24

## 2022-03-21 RX ORDER — HEPARIN SODIUM 5000 [USP'U]/ML
5000 INJECTION, SOLUTION INTRAVENOUS; SUBCUTANEOUS EVERY 8 HOURS SCHEDULED
Status: DISCONTINUED | OUTPATIENT
Start: 2022-03-21 | End: 2022-03-24

## 2022-03-21 RX ORDER — PREGABALIN 25 MG/1
50 CAPSULE ORAL NIGHTLY
Status: DISCONTINUED | OUTPATIENT
Start: 2022-03-21 | End: 2022-03-24

## 2022-03-21 RX ORDER — NITROFURANTOIN 25; 75 MG/1; MG/1
100 CAPSULE ORAL NIGHTLY
Refills: 0 | Status: DISCONTINUED | OUTPATIENT
Start: 2022-03-21 | End: 2022-03-22

## 2022-03-21 RX ORDER — ONDANSETRON 2 MG/ML
4 INJECTION INTRAMUSCULAR; INTRAVENOUS EVERY 6 HOURS PRN
Status: DISCONTINUED | OUTPATIENT
Start: 2022-03-21 | End: 2022-03-24

## 2022-03-21 RX ORDER — ALBUTEROL SULFATE 90 UG/1
2 AEROSOL, METERED RESPIRATORY (INHALATION) EVERY 4 HOURS PRN
Status: DISCONTINUED | OUTPATIENT
Start: 2022-03-21 | End: 2022-03-24

## 2022-03-21 RX ORDER — PANTOPRAZOLE SODIUM 40 MG/1
40 TABLET, DELAYED RELEASE ORAL
Status: DISCONTINUED | OUTPATIENT
Start: 2022-03-22 | End: 2022-03-24

## 2022-03-22 LAB
ANION GAP SERPL CALC-SCNC: 5 MMOL/L (ref 0–18)
BASOPHILS # BLD AUTO: 0.04 X10(3) UL (ref 0–0.2)
BASOPHILS NFR BLD AUTO: 0.6 %
BUN BLD-MCNC: 17 MG/DL (ref 7–18)
CALCIUM BLD-MCNC: 8.9 MG/DL (ref 8.5–10.1)
CHLORIDE SERPL-SCNC: 111 MMOL/L (ref 98–112)
CORTIS SERPL-MCNC: 12.3 UG/DL
CORTIS SERPL-MCNC: 18.4 UG/DL
CORTIS SERPL-MCNC: 21.4 UG/DL
CORTIS SERPL-MCNC: 4.5 UG/DL
CREAT BLD-MCNC: 0.74 MG/DL
EOSINOPHIL # BLD AUTO: 0.17 X10(3) UL (ref 0–0.7)
EOSINOPHIL NFR BLD AUTO: 2.6 %
ERYTHROCYTE [DISTWIDTH] IN BLOOD BY AUTOMATED COUNT: 22.4 %
GLUCOSE BLD-MCNC: 107 MG/DL (ref 70–99)
GLUCOSE BLD-MCNC: 111 MG/DL (ref 70–99)
GLUCOSE BLD-MCNC: 132 MG/DL (ref 70–99)
GLUCOSE BLD-MCNC: 139 MG/DL (ref 70–99)
GLUCOSE BLD-MCNC: 146 MG/DL (ref 70–99)
HCT VFR BLD AUTO: 31.3 %
HGB BLD-MCNC: 9.6 G/DL
IMM GRANULOCYTES # BLD AUTO: 0.02 X10(3) UL (ref 0–1)
IMM GRANULOCYTES NFR BLD: 0.3 %
LYMPHOCYTES # BLD AUTO: 1.94 X10(3) UL (ref 1–4)
MCH RBC QN AUTO: 27.4 PG (ref 26–34)
MCHC RBC AUTO-ENTMCNC: 30.7 G/DL (ref 31–37)
MCV RBC AUTO: 89.2 FL
MONOCYTES # BLD AUTO: 0.5 X10(3) UL (ref 0.1–1)
MONOCYTES NFR BLD AUTO: 7.7 %
NEUTROPHILS # BLD AUTO: 3.8 X10 (3) UL (ref 1.5–7.7)
NEUTROPHILS # BLD AUTO: 3.8 X10(3) UL (ref 1.5–7.7)
NEUTROPHILS NFR BLD AUTO: 58.8 %
OSMOLALITY SERPL CALC.SUM OF ELEC: 290 MOSM/KG (ref 275–295)
PLATELET # BLD AUTO: 284 10(3)UL (ref 150–450)
POTASSIUM SERPL-SCNC: 3.9 MMOL/L (ref 3.5–5.1)
RBC # BLD AUTO: 3.51 X10(6)UL
SODIUM SERPL-SCNC: 139 MMOL/L (ref 136–145)
WBC # BLD AUTO: 6.5 X10(3) UL (ref 4–11)

## 2022-03-22 PROCEDURE — 82533 TOTAL CORTISOL: CPT | Performed by: INTERNAL MEDICINE

## 2022-03-22 PROCEDURE — 94640 AIRWAY INHALATION TREATMENT: CPT

## 2022-03-22 PROCEDURE — 97530 THERAPEUTIC ACTIVITIES: CPT

## 2022-03-22 PROCEDURE — 97116 GAIT TRAINING THERAPY: CPT

## 2022-03-22 PROCEDURE — 82962 GLUCOSE BLOOD TEST: CPT

## 2022-03-22 PROCEDURE — 97162 PT EVAL MOD COMPLEX 30 MIN: CPT

## 2022-03-22 PROCEDURE — 80048 BASIC METABOLIC PNL TOTAL CA: CPT | Performed by: INTERNAL MEDICINE

## 2022-03-22 PROCEDURE — 85025 COMPLETE CBC W/AUTO DIFF WBC: CPT | Performed by: INTERNAL MEDICINE

## 2022-03-22 RX ORDER — COSYNTROPIN 0.25 MG/ML
0.25 INJECTION, POWDER, FOR SOLUTION INTRAMUSCULAR; INTRAVENOUS ONCE
Status: COMPLETED | OUTPATIENT
Start: 2022-03-22 | End: 2022-03-22

## 2022-03-22 RX ORDER — FLUTICASONE PROPIONATE 50 MCG
1 SPRAY, SUSPENSION (ML) NASAL DAILY
Status: DISCONTINUED | OUTPATIENT
Start: 2022-03-22 | End: 2022-03-24

## 2022-03-22 NOTE — PROGRESS NOTES
03/22/22 1824   Clinical Encounter Type   Visited With Patient   Routine Visit Introduction  (HCPOA visit)   Continue Visiting No   Taxonomy   Intended Effects Aligning care plan with patient's values   Interventions Acknowledge current situation; Ask guided questions    provided emotional support and active listening as pt. Shared of health challenges and affect they were having in many areas of her life.  discussed a HCPOA and left the form with her. Chaplains available for support via consult or pager [phone].

## 2022-03-22 NOTE — PROGRESS NOTES
Pt A&Ox4. On tele running NSR. On RA. Positive orthostatics. PT to see. Cardiac diet. Continent, uses bedpan d/t positive orthostatics. Frequent urination. 0.9% NaCl @ 100. Accuchecks. Denied pain.

## 2022-03-22 NOTE — PROGRESS NOTES
Received pt. Via stretcher. Pt. Is Ax4, resting in bed.   Urine sent  Contact Plus Isolation  RA, Tele: ST, intermittent   Bedpan  PT/OT consult placed    Orthostatic vital signs Every Shift    PIV: .9/100    Heparin SubQ--refused SCD's

## 2022-03-22 NOTE — PHYSICAL THERAPY NOTE
PHYSICAL THERAPY EVALUATION - INPATIENT     Room Number: 6676/2065-W  Evaluation Date: 3/22/2022  Type of Evaluation: Initial  Physician Order: PT Eval and Treat    Presenting Problem: orthostatic hypotension  Co-Morbidities : TLIF, B THR, RA, HTN  Reason for Therapy: Mobility Dysfunction and Discharge Planning    History related to current admission: Patient is a 67year old female admitted on 3/21/2022 from home for dizziness x 1 week. Pt diagnosed with orthostatic hypotension -receiving fluids. Of note, pt with recent ER/admissions for recurrent signs/symptoms. +UTI ESBL ongoing for the few weeks. ASSESSMENT   In this PT evaluation, the patient presents with the following impairments decreased activity tolerance, altered hemodynamics, generalized weakness due to comorbidities of RA, OA, and DARRICK alongside with decline in function due to symptoms. These impairments and comorbidities manifest themselves as functional limitations in independent bed mobility, transfers, and gait. Pt able to particiapte in prolonged standing activities, short distance ambulation with use of RW and assist.  The patient is below baseline and would benefit from skilled inpatient PT to address the above deficits to assist patient in returning to prior to level of function. Functional outcome measures completed include [facility] The -PAC '6-Clicks' Inpatient Basic Mobility Short Form was completed and this patient is demonstrating a Approx Degree of Impairment: 35.83%  degree of impairment in mobility. Research supports that patients with this level of impairment may benefit from further therapy at home to progress indep and safety, decrease fall risk. Based on this evaluation, patient's clinical presentation is evolving and overall the evaluation complexity is considered moderate. DISCHARGE RECOMMENDATIONS  PT Discharge Recommendations: Home with home health PT; Intermittent Supervision    PLAN  PT Treatment Plan: Patient education; Family education;Gait training;Stair training  Rehab Potential : Good  Frequency (Obs): 3-5x/week  Number of Visits to Meet Established Goals: 4      CURRENT GOALS    Goal #1 Patient is able to negotiate a flight of stairs with CGA     Goal #2 Patient is able to demonstrate transfers EOB to/from Chair/Wheelchair at assistance level: modified independent     Goal #3 Patient is able to ambulate 100 feet with assist device: walker - rolling at assistance level: supervision     Goal #4    Goal #5    Goal #6    Goal Comments: Goals established on 3/22/2022    HOME SITUATION  Type of Home: House   Home Layout: Bed/bath upstairs                Lives With: Spouse  Drives: No  Patient Owned Equipment: Rolling walker;Cane       Prior Level of [place]: prior to December indep; since December has had multiple medical issues with gradual decline in activities; uses RW, able to manage stairs mod indeply;  is retired and at home to assist with iADLs, hx of fall 2x over the past 6 weeks due to dizziness; daughter is paramedic/ and has come to the house to assist with floor to chair/bed transfer    SUBJECTIVE  C/o dizziness of 7/10 with standing/transitions      OBJECTIVE  Precautions: Bed/chair alarm  Fall Risk: High fall risk    WEIGHT BEARING RESTRICTION  Weight Bearing Restriction: None                PAIN ASSESSMENT  Ratin          COGNITION  A&Ox4, appropriate insight into deficits and safety awareness    RANGE OF MOTION AND STRENGTH ASSESSMENT  Upper extremity ROM and strength are within functional limits     Lower extremity ROM is within functional limits    Lower extremity strength is within functional limits      BALANCE  Static Sitting: Good  Dynamic Sitting: Good  Static Standing: Fair +  Dynamic Standing: Fair    ADDITIONAL TESTS                                    ACTIVITY TOLERANCE           BP: 123/66        Patient Position: Standing    O2 WALK  Oxygen Therapy  SPO2% Ambulation on Oxygen: 100    NEUROLOGICAL FINDINGS      RLE with absent sensation at mid calf and below (chronic) otherwise SILT BUE/LLE                  AM-PAC '6-Clicks' INPATIENT SHORT FORM - BASIC MOBILITY  How much difficulty does the patient currently have. .. Patient Difficulty: Turning over in bed (including adjusting bedclothes, sheets and blankets)?: None   Patient Difficulty: Sitting down on and standing up from a chair with arms (e.g., wheelchair, bedside commode, etc.): A Little   Patient Difficulty: Moving from lying on back to sitting on the side of the bed?: None   How much help from another person does the patient currently need. .. Help from Another: Moving to and from a bed to a chair (including a wheelchair)?: A Little   Help from Another: Need to walk in hospital room?: A Little   Help from Another: Climbing 3-5 steps with a railing?: A Little       AM-PAC Score:  Raw Score: 20   Approx Degree of Impairment: 35.83%   Standardized Score (AM-PAC Scale): 47.67   CMS Modifier (G-Code): CJ    FUNCTIONAL ABILITY STATUS  Gait Assessment   Functional Mobility/Gait Assessment  Gait Assistance: Supervision  Distance (ft): 50  Assistive Device: Rolling walker    Skilled Therapy Provided     Bed Mobility:  Rolling: mod I  Supine to sit: mod I   Sit to supine: mod I     Transfer Mobility:  Sit to stand: CGA   Stand to sit: CGA  Gait = 10ft x1 50ft x1 with RW CGA, decreased speed, inc use of UE on RW  Toilet transfer CGA  Toileting mod I  Standing activities x 5 mins: marches, static stance    BP as follows:  Supine: 143/68 80bpm  Immediate Sittin/71  Immediate Standin/66: 100bpm  2 mins standin/91  Sittin/71  Supine recovery: 149/89    Education: dc recs, home safety, ue of RW, POC, resumption of supine/sitting LE therex, goals, pt in agreement    Patient End of Session: In bed; With [address] staff;Needs met;Call light within reach;RN aware of session/findings; All patient questions and concerns addressed; Family present

## 2022-03-22 NOTE — PLAN OF CARE
Patient A&O x 4 this AM, complaints of generalized pain as a result of her OA in her back and RA generalized. PRN norco given. Up to bathroom with this writer this AM, no complaints of dizziness at that time. Orthostatics collected, and positive. Patient was light headed at that time. Bolus ordered by MD. Monitor cortisol level after injection today. Positive urine culture, patient is on long term antibiotics outpatient, with history of ESBL of urine. ID consulted to evaluate case, patient has had to do IV abx therapy outpatient before. All needs met at this time, staff will continue to monitor.

## 2022-03-22 NOTE — PROGRESS NOTES
03/22/22 0905 03/22/22 0910 03/22/22 0915   Vitals   /68 145/76 105/69   MAP (mmHg) 91 93 76   BP Location Right arm Right arm Right arm   BP Method Automatic Automatic Automatic   Patient Position Lying Sitting Standing     Orthostatic BP 3/22/22 AM

## 2022-03-22 NOTE — CM/SW NOTE
MSW referred chart and placed KRISH order for residential [facility] AT [facility].

## 2022-03-23 ENCOUNTER — APPOINTMENT (OUTPATIENT)
Dept: ULTRASOUND IMAGING | Facility: HOSPITAL | Age: 72
End: 2022-03-23
Attending: INTERNAL MEDICINE
Payer: MEDICARE

## 2022-03-23 LAB
GLUCOSE BLD-MCNC: 108 MG/DL (ref 70–99)
GLUCOSE BLD-MCNC: 116 MG/DL (ref 70–99)
GLUCOSE BLD-MCNC: 117 MG/DL (ref 70–99)
GLUCOSE BLD-MCNC: 136 MG/DL (ref 70–99)
GLUCOSE BLD-MCNC: 142 MG/DL (ref 70–99)
GLUCOSE BLD-MCNC: 143 MG/DL (ref 70–99)

## 2022-03-23 PROCEDURE — 82962 GLUCOSE BLOOD TEST: CPT

## 2022-03-23 PROCEDURE — 97116 GAIT TRAINING THERAPY: CPT

## 2022-03-23 PROCEDURE — 76770 US EXAM ABDO BACK WALL COMP: CPT | Performed by: INTERNAL MEDICINE

## 2022-03-23 PROCEDURE — 97530 THERAPEUTIC ACTIVITIES: CPT

## 2022-03-23 RX ORDER — HYDRALAZINE HYDROCHLORIDE 20 MG/ML
10 INJECTION INTRAMUSCULAR; INTRAVENOUS EVERY 6 HOURS PRN
Status: DISCONTINUED | OUTPATIENT
Start: 2022-03-23 | End: 2022-03-24

## 2022-03-23 NOTE — CM/SW NOTE
Care Progression Note:  Active Acute Medical Issue:   Orthostatic hypotension , resolved after ivf    Other Contributing Medical Factors/Dx:  E coli ESBL bacteruria, on po nitrofurantion pta. Length of stay: 2  GMLOS: 2.3  Avoidable Delays: none  Discharge Barriers: US kidney  Expected discharge date: 3/23   Expected discharge level of care: Home with [address] [facility], lives w/spouse, accepted by residential home health.      Radha Ahmadi RN,   [id number]

## 2022-03-23 NOTE — PLAN OF CARE
Patient alert and oriented x4. On RA.  NSR on tele. Denies pain. Denies dizziness. IVF. fam at bedside. Blood sugars monitored. Orthos q shift. md aware of UC results. Esbl, ecoli. Plan for US kidneys. Resting comfortably in bed. WCTM.

## 2022-03-23 NOTE — PLAN OF CARE
Received pt. In bed on room air. Lungs clear. C/O of pain in back, hips, legs and knees. Medicated for pain with meds that were ordered. Not very pleasant. Very negative about all recent admissions and not finding out what is wrong with her. C/O of being dizzy. Used bedpan instead of walking to bathroom. IVF's at 100/hr. Resting.

## 2022-03-24 VITALS
BODY MASS INDEX: 27 KG/M2 | RESPIRATION RATE: 16 BRPM | TEMPERATURE: 98 F | SYSTOLIC BLOOD PRESSURE: 162 MMHG | DIASTOLIC BLOOD PRESSURE: 76 MMHG | WEIGHT: 185.19 LBS | HEART RATE: 92 BPM | OXYGEN SATURATION: 98 %

## 2022-03-24 LAB — GLUCOSE BLD-MCNC: 114 MG/DL (ref 70–99)

## 2022-03-24 PROCEDURE — 82962 GLUCOSE BLOOD TEST: CPT

## 2022-03-24 RX ORDER — RAMIPRIL 10 MG/1
10 CAPSULE ORAL NIGHTLY
Status: SHIPPED | COMMUNITY
Start: 2022-03-24

## 2022-03-24 NOTE — PLAN OF CARE
Received pt. In bed on room air. Lungs diminished. Medicated for pain in back, hips, knees and legs. Remains depressed and wants to home. More engaging tonight with conversation. Up to bathroom frequently throughout the night to void. Orthostatics done each shift. She was hypertensive with -215. Spoke with Dr. Serena Askew and orders received. Gave pt. Apresoline 10mg IV with fair response. Rested well during the night.

## 2022-03-24 NOTE — PROGRESS NOTES
Full Note to Follow    Pt seen and examined. LH much improved. Moving about room ok. To hold ramipril if systolic <425- to check bp at home.  Pt received IV hydralazine last night    Stable for dc    Thank You,  Esdras Paniagua, [facility]  Internal Medicine  Answering Service number: [phone]

## 2022-03-25 ENCOUNTER — TELEPHONE (OUTPATIENT)
Dept: UROLOGY | Facility: HOSPITAL | Age: 72
End: 2022-03-25

## 2022-03-25 NOTE — TELEPHONE ENCOUNTER
Upper tract imaging reviewed   Given stone findings on CT 12/25/21 and ultrasound 3/23/22, will dw urology

## 2022-03-25 NOTE — TELEPHONE ENCOUNTER
TC to pt in follow up  Pt recently in hospital for hypotension, seen by ID  Was recommended per ID to discontinue abx   Pt reports she already picked up macrodantin rx, worried about infxn, unsure what to do   Denies current s/sx of UTI  Reports she is usually asx, but reports one UTI that caused sepsis   Risks vs. Benefits of abx use discussed w/ pt   Ultimately left decision up to pt  Pt wishes to take macrodantin rx for next month  Recommended urine culture only w/ symptoms   All questions answered   She understands and agrees to plan

## 2022-03-25 NOTE — TELEPHONE ENCOUNTER
Reviewed imaging  Stones in kidneys may need to be managed in order to address underlying UTIs  rec urology f/u  Spoke w/ pt, discussed above

## 2022-03-30 PROBLEM — E87.1 HYPONATREMIA: Status: RESOLVED | Noted: 2022-03-21 | Resolved: 2022-03-30

## 2022-03-30 PROBLEM — R73.9 HYPERGLYCEMIA: Status: RESOLVED | Noted: 2022-03-21 | Resolved: 2022-03-30

## 2022-03-30 PROBLEM — I95.1 ORTHOSTATIC HYPOTENSION: Status: RESOLVED | Noted: 2022-03-21 | Resolved: 2022-03-30

## 2022-04-05 ENCOUNTER — VIRTUAL PHONE E/M (OUTPATIENT)
Dept: UROLOGY | Facility: CLINIC | Age: 72
End: 2022-04-05
Attending: OBSTETRICS & GYNECOLOGY
Payer: MEDICARE

## 2022-04-05 VITALS — HEIGHT: 67 IN | BODY MASS INDEX: 29.35 KG/M2 | WEIGHT: 187 LBS

## 2022-04-05 DIAGNOSIS — Z98.890 POST-OPERATIVE STATE: ICD-10-CM

## 2022-04-05 DIAGNOSIS — N39.0 FREQUENT UTI: ICD-10-CM

## 2022-04-05 DIAGNOSIS — N95.2 POSTMENOPAUSAL ATROPHIC VAGINITIS: Primary | ICD-10-CM

## 2022-05-21 ENCOUNTER — LAB ENCOUNTER (OUTPATIENT)
Dept: LAB | Age: 72
End: 2022-05-21
Attending: UROLOGY
Payer: MEDICARE

## 2022-05-21 DIAGNOSIS — Z01.812 ENCOUNTER FOR PREOPERATIVE SCREENING LABORATORY TESTING FOR COVID-19 VIRUS: ICD-10-CM

## 2022-05-21 DIAGNOSIS — Z20.822 ENCOUNTER FOR PREOPERATIVE SCREENING LABORATORY TESTING FOR COVID-19 VIRUS: ICD-10-CM

## 2022-05-22 LAB — SARS-COV-2 RNA RESP QL NAA+PROBE: NOT DETECTED

## 2022-05-24 ENCOUNTER — APPOINTMENT (OUTPATIENT)
Dept: GENERAL RADIOLOGY | Facility: HOSPITAL | Age: 72
End: 2022-05-24
Attending: UROLOGY
Payer: MEDICARE

## 2022-05-24 ENCOUNTER — HOSPITAL ENCOUNTER (OUTPATIENT)
Facility: HOSPITAL | Age: 72
Discharge: HOME | End: 2022-05-26
Attending: UROLOGY | Admitting: UROLOGY
Payer: MEDICARE

## 2022-05-24 ENCOUNTER — ANESTHESIA (OUTPATIENT)
Dept: SURGERY | Facility: HOSPITAL | Age: 72
End: 2022-05-24
Payer: MEDICARE

## 2022-05-24 ENCOUNTER — ANESTHESIA EVENT (OUTPATIENT)
Dept: SURGERY | Facility: HOSPITAL | Age: 72
End: 2022-05-24
Payer: MEDICARE

## 2022-05-24 DIAGNOSIS — Z01.812 ENCOUNTER FOR PREOPERATIVE SCREENING LABORATORY TESTING FOR COVID-19 VIRUS: Primary | ICD-10-CM

## 2022-05-24 DIAGNOSIS — Z20.822 ENCOUNTER FOR PREOPERATIVE SCREENING LABORATORY TESTING FOR COVID-19 VIRUS: Primary | ICD-10-CM

## 2022-05-24 LAB
GLUCOSE BLD-MCNC: 118 MG/DL (ref 70–99)
GLUCOSE BLD-MCNC: 129 MG/DL (ref 70–99)

## 2022-05-24 PROCEDURE — 0TF33ZZ FRAGMENTATION IN RIGHT KIDNEY PELVIS, PERCUTANEOUS APPROACH: ICD-10-PCS | Performed by: UROLOGY

## 2022-05-24 PROCEDURE — 0TJ98ZZ INSPECTION OF URETER, VIA NATURAL OR ARTIFICIAL OPENING ENDOSCOPIC: ICD-10-PCS | Performed by: UROLOGY

## 2022-05-24 PROCEDURE — 88300 SURGICAL PATH GROSS: CPT | Performed by: UROLOGY

## 2022-05-24 PROCEDURE — 82962 GLUCOSE BLOOD TEST: CPT

## 2022-05-24 PROCEDURE — 82365 CALCULUS SPECTROSCOPY: CPT | Performed by: UROLOGY

## 2022-05-24 RX ORDER — DEXAMETHASONE SODIUM PHOSPHATE 4 MG/ML
VIAL (ML) INJECTION AS NEEDED
Status: DISCONTINUED | OUTPATIENT
Start: 2022-05-24 | End: 2022-05-24 | Stop reason: SURG

## 2022-05-24 RX ORDER — ECHINACEA PURPUREA EXTRACT 125 MG
1 TABLET ORAL
COMMUNITY

## 2022-05-24 RX ORDER — CEFAZOLIN SODIUM/WATER 2 G/20 ML
2 SYRINGE (ML) INTRAVENOUS ONCE
Status: DISCONTINUED | OUTPATIENT
Start: 2022-05-24 | End: 2022-05-24

## 2022-05-24 RX ORDER — HYDROMORPHONE HYDROCHLORIDE 1 MG/ML
INJECTION, SOLUTION INTRAMUSCULAR; INTRAVENOUS; SUBCUTANEOUS
Status: COMPLETED
Start: 2022-05-24 | End: 2022-05-24

## 2022-05-24 RX ORDER — NICOTINE POLACRILEX 4 MG
30 LOZENGE BUCCAL
Status: DISCONTINUED | OUTPATIENT
Start: 2022-05-24 | End: 2022-05-24 | Stop reason: HOSPADM

## 2022-05-24 RX ORDER — NICOTINE POLACRILEX 4 MG
15 LOZENGE BUCCAL
Status: DISCONTINUED | OUTPATIENT
Start: 2022-05-24 | End: 2022-05-24 | Stop reason: HOSPADM

## 2022-05-24 RX ORDER — SODIUM CHLORIDE, SODIUM LACTATE, POTASSIUM CHLORIDE, CALCIUM CHLORIDE 600; 310; 30; 20 MG/100ML; MG/100ML; MG/100ML; MG/100ML
INJECTION, SOLUTION INTRAVENOUS CONTINUOUS
Status: DISCONTINUED | OUTPATIENT
Start: 2022-05-24 | End: 2022-05-24

## 2022-05-24 RX ORDER — ROCURONIUM BROMIDE 10 MG/ML
INJECTION, SOLUTION INTRAVENOUS AS NEEDED
Status: DISCONTINUED | OUTPATIENT
Start: 2022-05-24 | End: 2022-05-24 | Stop reason: SURG

## 2022-05-24 RX ORDER — HYDROCODONE BITARTRATE AND ACETAMINOPHEN 5; 325 MG/1; MG/1
2 TABLET ORAL EVERY 4 HOURS PRN
Status: DISCONTINUED | OUTPATIENT
Start: 2022-05-24 | End: 2022-05-24

## 2022-05-24 RX ORDER — MIDAZOLAM HYDROCHLORIDE 1 MG/ML
1 INJECTION INTRAMUSCULAR; INTRAVENOUS EVERY 5 MIN PRN
Status: DISCONTINUED | OUTPATIENT
Start: 2022-05-24 | End: 2022-05-24 | Stop reason: HOSPADM

## 2022-05-24 RX ORDER — PHENYLEPHRINE HCL 10 MG/ML
VIAL (ML) INJECTION AS NEEDED
Status: DISCONTINUED | OUTPATIENT
Start: 2022-05-24 | End: 2022-05-24 | Stop reason: SURG

## 2022-05-24 RX ORDER — SODIUM CHLORIDE, SODIUM LACTATE, POTASSIUM CHLORIDE, CALCIUM CHLORIDE 600; 310; 30; 20 MG/100ML; MG/100ML; MG/100ML; MG/100ML
INJECTION, SOLUTION INTRAVENOUS CONTINUOUS
Status: DISCONTINUED | OUTPATIENT
Start: 2022-05-24 | End: 2022-05-24 | Stop reason: HOSPADM

## 2022-05-24 RX ORDER — HYDROMORPHONE HYDROCHLORIDE 1 MG/ML
0.2 INJECTION, SOLUTION INTRAMUSCULAR; INTRAVENOUS; SUBCUTANEOUS EVERY 5 MIN PRN
Status: DISCONTINUED | OUTPATIENT
Start: 2022-05-24 | End: 2022-05-24 | Stop reason: HOSPADM

## 2022-05-24 RX ORDER — PANTOPRAZOLE SODIUM 40 MG/1
40 TABLET, DELAYED RELEASE ORAL
Refills: 5 | Status: DISCONTINUED | OUTPATIENT
Start: 2022-05-25 | End: 2022-05-26

## 2022-05-24 RX ORDER — ONDANSETRON 2 MG/ML
4 INJECTION INTRAMUSCULAR; INTRAVENOUS EVERY 6 HOURS PRN
Status: DISCONTINUED | OUTPATIENT
Start: 2022-05-24 | End: 2022-05-26

## 2022-05-24 RX ORDER — ONDANSETRON 4 MG/1
4 TABLET, FILM COATED ORAL EVERY 6 HOURS PRN
Status: DISCONTINUED | OUTPATIENT
Start: 2022-05-24 | End: 2022-05-26

## 2022-05-24 RX ORDER — ONDANSETRON 2 MG/ML
INJECTION INTRAMUSCULAR; INTRAVENOUS AS NEEDED
Status: DISCONTINUED | OUTPATIENT
Start: 2022-05-24 | End: 2022-05-24 | Stop reason: SURG

## 2022-05-24 RX ORDER — HYDROMORPHONE HYDROCHLORIDE 1 MG/ML
0.4 INJECTION, SOLUTION INTRAMUSCULAR; INTRAVENOUS; SUBCUTANEOUS EVERY 5 MIN PRN
Status: DISCONTINUED | OUTPATIENT
Start: 2022-05-24 | End: 2022-05-24 | Stop reason: HOSPADM

## 2022-05-24 RX ORDER — ACETAMINOPHEN 325 MG/1
650 TABLET ORAL EVERY 6 HOURS PRN
Status: DISCONTINUED | OUTPATIENT
Start: 2022-05-24 | End: 2022-05-26

## 2022-05-24 RX ORDER — ACETAMINOPHEN, ASPIRIN AND CAFFEINE 250; 250; 65 MG/1; MG/1; MG/1
1 TABLET, FILM COATED ORAL AS DIRECTED
COMMUNITY

## 2022-05-24 RX ORDER — BUPIVACAINE HYDROCHLORIDE AND EPINEPHRINE 5; 5 MG/ML; UG/ML
INJECTION, SOLUTION EPIDURAL; INTRACAUDAL; PERINEURAL AS NEEDED
Status: DISCONTINUED | OUTPATIENT
Start: 2022-05-24 | End: 2022-05-24 | Stop reason: HOSPADM

## 2022-05-24 RX ORDER — LEFLUNOMIDE 20 MG/1
20 TABLET ORAL DAILY
Status: DISCONTINUED | OUTPATIENT
Start: 2022-05-24 | End: 2022-05-26

## 2022-05-24 RX ORDER — SODIUM CHLORIDE 9 MG/ML
INJECTION, SOLUTION INTRAVENOUS CONTINUOUS
Status: DISCONTINUED | OUTPATIENT
Start: 2022-05-24 | End: 2022-05-26

## 2022-05-24 RX ORDER — DEXTROSE MONOHYDRATE 25 G/50ML
50 INJECTION, SOLUTION INTRAVENOUS
Status: DISCONTINUED | OUTPATIENT
Start: 2022-05-24 | End: 2022-05-24 | Stop reason: HOSPADM

## 2022-05-24 RX ORDER — HYDROMORPHONE HYDROCHLORIDE 1 MG/ML
0.6 INJECTION, SOLUTION INTRAMUSCULAR; INTRAVENOUS; SUBCUTANEOUS EVERY 5 MIN PRN
Status: DISCONTINUED | OUTPATIENT
Start: 2022-05-24 | End: 2022-05-24 | Stop reason: HOSPADM

## 2022-05-24 RX ORDER — MIRABEGRON 50 MG/1
1 TABLET, FILM COATED, EXTENDED RELEASE ORAL DAILY
COMMUNITY
End: 2022-05-27

## 2022-05-24 RX ORDER — GARLIC EXTRACT 500 MG
1 CAPSULE ORAL DAILY
Status: DISCONTINUED | OUTPATIENT
Start: 2022-05-24 | End: 2022-05-26

## 2022-05-24 RX ORDER — ATORVASTATIN CALCIUM 40 MG/1
40 TABLET, FILM COATED ORAL DAILY
Status: DISCONTINUED | OUTPATIENT
Start: 2022-05-24 | End: 2022-05-26

## 2022-05-24 RX ORDER — ALPRAZOLAM 0.25 MG/1
0.25 TABLET ORAL EVERY 6 HOURS PRN
Status: DISCONTINUED | OUTPATIENT
Start: 2022-05-24 | End: 2022-05-26

## 2022-05-24 RX ORDER — ONDANSETRON 4 MG/1
4 TABLET, FILM COATED ORAL EVERY 8 HOURS PRN
Status: DISCONTINUED | OUTPATIENT
Start: 2022-05-24 | End: 2022-05-24

## 2022-05-24 RX ORDER — HYDROCODONE BITARTRATE AND ACETAMINOPHEN 5; 325 MG/1; MG/1
1 TABLET ORAL EVERY 4 HOURS PRN
Status: DISCONTINUED | OUTPATIENT
Start: 2022-05-24 | End: 2022-05-24

## 2022-05-24 RX ORDER — ONDANSETRON 2 MG/ML
4 INJECTION INTRAMUSCULAR; INTRAVENOUS EVERY 6 HOURS PRN
Status: DISCONTINUED | OUTPATIENT
Start: 2022-05-24 | End: 2022-05-24 | Stop reason: HOSPADM

## 2022-05-24 RX ORDER — ALBUTEROL SULFATE 90 UG/1
2 AEROSOL, METERED RESPIRATORY (INHALATION) EVERY 4 HOURS PRN
Status: DISCONTINUED | OUTPATIENT
Start: 2022-05-24 | End: 2022-05-26

## 2022-05-24 RX ORDER — NALOXONE HYDROCHLORIDE 0.4 MG/ML
80 INJECTION, SOLUTION INTRAMUSCULAR; INTRAVENOUS; SUBCUTANEOUS AS NEEDED
Status: DISCONTINUED | OUTPATIENT
Start: 2022-05-24 | End: 2022-05-24 | Stop reason: HOSPADM

## 2022-05-24 RX ORDER — METOCLOPRAMIDE HYDROCHLORIDE 5 MG/ML
10 INJECTION INTRAMUSCULAR; INTRAVENOUS EVERY 8 HOURS PRN
Status: DISCONTINUED | OUTPATIENT
Start: 2022-05-24 | End: 2022-05-24 | Stop reason: HOSPADM

## 2022-05-24 RX ORDER — MORPHINE SULFATE 4 MG/ML
4 INJECTION, SOLUTION INTRAMUSCULAR; INTRAVENOUS ONCE
Status: COMPLETED | OUTPATIENT
Start: 2022-05-24 | End: 2022-05-24

## 2022-05-24 RX ORDER — ACETAMINOPHEN 500 MG
1000 TABLET ORAL ONCE
Status: DISCONTINUED | OUTPATIENT
Start: 2022-05-24 | End: 2022-05-24 | Stop reason: HOSPADM

## 2022-05-24 RX ORDER — HYDROCODONE BITARTRATE AND ACETAMINOPHEN 10; 325 MG/1; MG/1
2 TABLET ORAL EVERY 4 HOURS PRN
Status: DISCONTINUED | OUTPATIENT
Start: 2022-05-24 | End: 2022-05-26

## 2022-05-24 RX ADMIN — DEXAMETHASONE SODIUM PHOSPHATE 4 MG: 4 MG/ML VIAL (ML) INJECTION at 11:41:00

## 2022-05-24 RX ADMIN — SODIUM CHLORIDE, SODIUM LACTATE, POTASSIUM CHLORIDE, CALCIUM CHLORIDE: 600; 310; 30; 20 INJECTION, SOLUTION INTRAVENOUS at 11:25:00

## 2022-05-24 RX ADMIN — PHENYLEPHRINE HCL 100 MCG: 10 MG/ML VIAL (ML) INJECTION at 12:31:00

## 2022-05-24 RX ADMIN — PHENYLEPHRINE HCL 200 MCG: 10 MG/ML VIAL (ML) INJECTION at 12:09:00

## 2022-05-24 RX ADMIN — ONDANSETRON 4 MG: 2 INJECTION INTRAMUSCULAR; INTRAVENOUS at 13:51:00

## 2022-05-24 RX ADMIN — ROCURONIUM BROMIDE 20 MG: 10 INJECTION, SOLUTION INTRAVENOUS at 12:47:00

## 2022-05-24 RX ADMIN — ROCURONIUM BROMIDE 50 MG: 10 INJECTION, SOLUTION INTRAVENOUS at 11:29:00

## 2022-05-24 NOTE — OPERATIVE REPORT
[address] [facility]  [id number]  5/24/2022    PREOPERATIVE DIAGNOSIS:  Right 16 mm partial staghorn renal calculus. POSTOPERATIVE DIAGNOSIS:  Right 16 mm partial staghorn renal calculus. PROCEDURE:    1. Cystoscopy, right ureteral catheterization, right  percutaneous renal access, right  percutaneous nephrolithotomy of 1.6 cm staghorn renal calculus  2. Intraoperative interpretation of fluoroscopy. SURGEON: Dr. Arroa Army:  General.     ESTIMATED BLOOD LOSS:  10 mL. SPECIMENS:  Right renal calculus fragments. DRAINS:    None     FINDINGS:  A 1.6 cm right  partial staghorn renal calculus with no right hydronephrosis. COMPLICATIONS:  None. OPERATIVE TECHNIQUE:  After the appropriate informed consent was obtained and in the chart, the patient was given preoperative antibiotics, and taken to the operating room. After bilateral sequential compression devices had been applied and satisfactory general anesthesia had been achieved, the patient's legs were placed in a frogleg position on the transfer cart. The patient's groin was prepped and draped in the usual sterile fashion. A well-lubricated 21-Lithuanian rigid cystoscope was then introduced through a normal female urethra and into the bladder. Upon entering the bladder, the bilateral ureteral orifices were seen in their normal expected anatomic position. A 5-Lithuanian open-ended ureteral catheter was then used to intubate the right  ureteral orifice and the ureteral catheter was placed at 24 cm without any resistance. The cystoscope was removed leaving the ureteral catheter in place. A 16-Lithuanian Aguirre catheter was placed in the bladder and was connected to gravity bag drainage. The ureteral catheter was then affixed to the Aguirre catheter externally with Tegaderm adhesive bandages. The patient was then turned prone on the operating room table with appropriate padding at all pressure points.   The right  flank was then prepped and draped in the usual sterile fashion. An 18-gauge percutaneous renal access needle was then used to gain access into the right  lower pole collecting system while using the externalized right ureteral catheter to opacify the collecting system with contrast.  A 0.035-inch glidewire was attempted to be passed into the collecting system but was unsuccessful due to stone impaction so a 0.025 in glide wire was placed and was successfully passed through the access needle into the collecting system down the ureter and coiled within the bladder. Then the needle was removed. Local anesthetic was then used to anesthetize the skin and subcutaneous tissues in the area of the puncture site. A 6 mm transverse incision was made overlying the puncture site. A 6-Dutch open ended ureteral catheter was passed over the 0.025 in glide wire and the wire was exchanged for a 0.035 in stiff glide wire. An 8-Dutch to 10-Dutch coaxial dilator was placed over the glidewire and was used to place a second glidewire down the ureter which was also coiled within the bladder. One of the glidewires was then affixed to the drapes and used as a safety wire throughout the case. A 17. 5-Dutch Storz mini-percutaneous sheath and trocar was then placed over the working glidewire, and under fluoroscopic guidance was used to dilate a tract from the skin into the renal pelvis. The trocar was removed. The mini nephroscope was placed through the access sheath into the renal pelvis and pulled back to the lower access point where the renal calculus as above was encountered. The bare pneumatic probe was used to fragment the calculus and the fragments were irrigated from the collecting system through the sheath. A 1.9 Fr zero tipped basket was passed down the ureter and there were no ureteral fragments seen.  The externalized ureteral catheter was removed as well as the glide wires and contrast was seen to flow freely down the ureter to the bladder. A flexible ureteroscope was also passed down the ureter confirming so significant stone fragments. There were no calculus fragments seen under fluoroscopy either. The nephroscope was pulled back to the lower access point and there was minimal bleeding seen. A 16-Slovenian was passed over the safety glide wire with the tip in the renal pelvis and the balloon was inflated with 2 mL saline and contrast mixture within the accessed lower pole calyx. A 3-0 silk suture was used to affix the nephrostomy catheter to the skin and a sterile pressure dressing was applied. All sponge, needle, and instrument counts were correct. She will be admitted to the hospital for further care with likely discharge home tomorrow.       Nghia Saba, [facility] Urology

## 2022-05-24 NOTE — ANESTHESIA PROCEDURE NOTES
Airway  Date/Time: 5/24/2022 11:32 AM  Urgency: elective    Airway not difficult    General Information and Staff    Patient location during procedure: OR  Anesthesiologist: Chaitanya Logan MD  Performed: anesthesiologist     Indications and Patient Condition  Indications for airway management: anesthesia  Spontaneous Ventilation: absent  Sedation level: deep  Preoxygenated: yes  Patient position: sniffing  MILS maintained throughout  Mask difficulty assessment: 1 - vent by mask  Planned trial extubation    Final Airway Details  Final airway type: endotracheal airway      Successful airway: ETT  Cuffed: yes   Successful intubation technique: direct laryngoscopy  Facilitating devices/methods: intubating stylet  Endotracheal tube insertion site: oral  Blade: Vida  Blade size: #3  ETT size (mm): 7.0    Cormack-Lehane Classification: grade I - full view of glottis  Placement verified by: chest auscultation and capnometry   Cuff volume (mL): 6  Measured from: lips  ETT to lips (cm): 22  Number of attempts at approach: 1

## 2022-05-24 NOTE — PROGRESS NOTES
Pt is alert and oriented x4.  present at bedside during admission. Pt has dobson cath, 16F. Pt has nephrostomy tube 16 F, right lateral side. IVF infusing. Call light within reach. Safety measures in place.

## 2022-05-24 NOTE — H&P
H&P dated 5/2/22 by Dr. Marshal Salcedo was reviewed. No changes to be made. Patient seen in preoperative area. We discussed the surgical plan for today and again discussed risks, benefits, alternatives, and post operative expectations. Patient verbalized understanding and all questions answered. She would like to proceed with surgery as planned. RIGHT side was confirmed and marked.     Barb Whitlock, [address] [place] [address] Urology

## 2022-05-25 LAB
ANION GAP SERPL CALC-SCNC: 4 MMOL/L (ref 0–18)
BASOPHILS # BLD AUTO: 0.04 X10(3) UL (ref 0–0.2)
BASOPHILS NFR BLD AUTO: 0.2 %
BUN BLD-MCNC: 21 MG/DL (ref 7–18)
CALCIUM BLD-MCNC: 8.8 MG/DL (ref 8.5–10.1)
CHLORIDE SERPL-SCNC: 108 MMOL/L (ref 98–112)
CO2 SERPL-SCNC: 18 MMOL/L (ref 21–32)
CREAT BLD-MCNC: 1.45 MG/DL
EOSINOPHIL # BLD AUTO: 0.05 X10(3) UL (ref 0–0.7)
EOSINOPHIL NFR BLD AUTO: 0.2 %
ERYTHROCYTE [DISTWIDTH] IN BLOOD BY AUTOMATED COUNT: 18.5 %
GLUCOSE BLD-MCNC: 114 MG/DL (ref 70–99)
HCT VFR BLD AUTO: 31.5 %
HGB BLD-MCNC: 9.7 G/DL
IMM GRANULOCYTES # BLD AUTO: 0.1 X10(3) UL (ref 0–1)
IMM GRANULOCYTES NFR BLD: 0.5 %
LYMPHOCYTES # BLD AUTO: 0.73 X10(3) UL (ref 1–4)
LYMPHOCYTES NFR BLD AUTO: 3.5 %
MCH RBC QN AUTO: 29.3 PG (ref 26–34)
MCHC RBC AUTO-ENTMCNC: 30.8 G/DL (ref 31–37)
MCV RBC AUTO: 95.2 FL
MONOCYTES # BLD AUTO: 0.81 X10(3) UL (ref 0.1–1)
MONOCYTES NFR BLD AUTO: 3.9 %
NEUTROPHILS # BLD AUTO: 19.14 X10 (3) UL (ref 1.5–7.7)
NEUTROPHILS # BLD AUTO: 19.14 X10(3) UL (ref 1.5–7.7)
NEUTROPHILS NFR BLD AUTO: 91.7 %
OSMOLALITY SERPL CALC.SUM OF ELEC: 274 MOSM/KG (ref 275–295)
PLATELET # BLD AUTO: 326 10(3)UL (ref 150–450)
POTASSIUM SERPL-SCNC: 5.5 MMOL/L (ref 3.5–5.1)
RBC # BLD AUTO: 3.31 X10(6)UL
SODIUM SERPL-SCNC: 130 MMOL/L (ref 136–145)
WBC # BLD AUTO: 20.9 X10(3) UL (ref 4–11)

## 2022-05-25 PROCEDURE — 80048 BASIC METABOLIC PNL TOTAL CA: CPT | Performed by: UROLOGY

## 2022-05-25 PROCEDURE — 85025 COMPLETE CBC W/AUTO DIFF WBC: CPT | Performed by: UROLOGY

## 2022-05-25 PROCEDURE — 87040 BLOOD CULTURE FOR BACTERIA: CPT | Performed by: INTERNAL MEDICINE

## 2022-05-25 RX ORDER — DIAZEPAM 5 MG/1
5 TABLET ORAL EVERY 8 HOURS PRN
Status: DISCONTINUED | OUTPATIENT
Start: 2022-05-25 | End: 2022-05-26

## 2022-05-25 RX ORDER — ACETAMINOPHEN 500 MG
1000 TABLET ORAL
Status: DISCONTINUED | OUTPATIENT
Start: 2022-05-25 | End: 2022-05-26

## 2022-05-25 RX ORDER — MORPHINE SULFATE 2 MG/ML
1 INJECTION, SOLUTION INTRAMUSCULAR; INTRAVENOUS EVERY 2 HOUR PRN
Status: DISCONTINUED | OUTPATIENT
Start: 2022-05-25 | End: 2022-05-26

## 2022-05-25 RX ORDER — DIAZEPAM 5 MG/1
10 TABLET ORAL EVERY 6 HOURS PRN
Status: DISCONTINUED | OUTPATIENT
Start: 2022-05-25 | End: 2022-05-26

## 2022-05-25 RX ORDER — MORPHINE SULFATE 2 MG/ML
2 INJECTION, SOLUTION INTRAMUSCULAR; INTRAVENOUS EVERY 2 HOUR PRN
Status: DISCONTINUED | OUTPATIENT
Start: 2022-05-25 | End: 2022-05-26

## 2022-05-25 RX ORDER — MORPHINE SULFATE 2 MG/ML
1-2 INJECTION, SOLUTION INTRAMUSCULAR; INTRAVENOUS EVERY 4 HOURS PRN
Status: DISCONTINUED | OUTPATIENT
Start: 2022-05-25 | End: 2022-05-25

## 2022-05-25 NOTE — PLAN OF CARE
Pt is alert and oriented, RA, IVF infusing, vss, Tele-NSR in the 80's, dobson with pink tinged urine/clear, R nephrostomy tube with dark bloody output, scant drainage, dressing at right c/d/I, advanced to soft diet, pt c/o pain at the right flank radiating to stomach, pain meds per mar, plan of care discussed with pt, call light in reach    0550:Nephrostomy tube this AM with increased output,  500ml out, color light red    Problem: Patient/Family Goals  Goal: Patient/Family Long Term Goal  Description: Patient's Long Term Goal: Discharge home    Interventions:  - IVF  -Tolerate diet  -Return to daily activities  - See additional Care Plan goals for specific interventions  Outcome: Progressing  Goal: Patient/Family Short Term Goal  Description: Patient's Short Term Goal: Pain management    Interventions:   - Pain assessment  Pain meds as needed  - See additional Care Plan goals for specific interventions  Outcome: Progressing     Problem: SAFETY ADULT - FALL  Goal: Free from fall injury  Description: INTERVENTIONS AS NEEDED:  - Assess patient for physical needs  - Identify cognitive and physical deficits and behaviors that affect risk of falls  - [place] fall precautions as indicated by assessment  - Educate patient/family on patient safety including physical imitations  - Instruct patient to call for assistance with activity based on assessment  - Modify environment to reduce risk of injury  - Provide assistive devices as appropriate  - Consider occupational and/or physical therapy consult to assist with strengthening/mobility  - Encourage toileting schedule  - Responsible Professional: , RN    - Evaluate and order additional treatment as indicated  - Responsible Professional: , MD  Outcome: Progressing     Problem: RISK FOR INFECTION - ADULT  Goal: Absence of fever/infection during anticipated neutropenic period  Description: INTERVENTIONS  - Monitor WBC  - Administer growth factors as ordered  - Implement neutropenic guidelines  Outcome: Progressing

## 2022-05-25 NOTE — PROGRESS NOTES
Pt is A&O x4. IVF infusing. Pt has nephrostomy tube draining pink tinged output. Pain medication per MAR. Pt tolerating diet well. Denies nausea/vomitting. No BM this shift. Call light within reach. Safety measures in place.

## 2022-05-26 ENCOUNTER — APPOINTMENT (OUTPATIENT)
Dept: GENERAL RADIOLOGY | Facility: HOSPITAL | Age: 72
End: 2022-05-26
Attending: INTERNAL MEDICINE
Payer: MEDICARE

## 2022-05-26 VITALS
SYSTOLIC BLOOD PRESSURE: 136 MMHG | OXYGEN SATURATION: 95 % | HEART RATE: 90 BPM | BODY MASS INDEX: 29.45 KG/M2 | TEMPERATURE: 98 F | DIASTOLIC BLOOD PRESSURE: 66 MMHG | HEIGHT: 67 IN | RESPIRATION RATE: 19 BRPM | WEIGHT: 187.63 LBS

## 2022-05-26 LAB
ANION GAP SERPL CALC-SCNC: 6 MMOL/L (ref 0–18)
ATRIAL RATE: 102 BPM
BASOPHILS # BLD AUTO: 0.04 X10(3) UL (ref 0–0.2)
BASOPHILS NFR BLD AUTO: 0.2 %
BUN BLD-MCNC: 27 MG/DL (ref 7–18)
CALCIUM BLD-MCNC: 8.8 MG/DL (ref 8.5–10.1)
CHLORIDE SERPL-SCNC: 110 MMOL/L (ref 98–112)
CO2 SERPL-SCNC: 21 MMOL/L (ref 21–32)
CREAT BLD-MCNC: 1.37 MG/DL
EOSINOPHIL # BLD AUTO: 0.01 X10(3) UL (ref 0–0.7)
EOSINOPHIL NFR BLD AUTO: 0.1 %
ERYTHROCYTE [DISTWIDTH] IN BLOOD BY AUTOMATED COUNT: 18.5 %
GLUCOSE BLD-MCNC: 135 MG/DL (ref 70–99)
HCT VFR BLD AUTO: 32.4 %
HGB BLD-MCNC: 10.1 G/DL
IMM GRANULOCYTES # BLD AUTO: 0.1 X10(3) UL (ref 0–1)
IMM GRANULOCYTES NFR BLD: 0.6 %
LYMPHOCYTES # BLD AUTO: 1.47 X10(3) UL (ref 1–4)
LYMPHOCYTES NFR BLD AUTO: 8.2 %
MCH RBC QN AUTO: 29.7 PG (ref 26–34)
MCHC RBC AUTO-ENTMCNC: 31.2 G/DL (ref 31–37)
MCV RBC AUTO: 95.3 FL
MONOCYTES # BLD AUTO: 1.16 X10(3) UL (ref 0.1–1)
MONOCYTES NFR BLD AUTO: 6.5 %
NEUTROPHILS # BLD AUTO: 15.05 X10 (3) UL (ref 1.5–7.7)
NEUTROPHILS # BLD AUTO: 15.05 X10(3) UL (ref 1.5–7.7)
NEUTROPHILS NFR BLD AUTO: 84.4 %
OSMOLALITY SERPL CALC.SUM OF ELEC: 291 MOSM/KG (ref 275–295)
P AXIS: 74 DEGREES
P-R INTERVAL: 154 MS
PLATELET # BLD AUTO: 297 10(3)UL (ref 150–450)
POTASSIUM SERPL-SCNC: 4.4 MMOL/L (ref 3.5–5.1)
Q-T INTERVAL: 348 MS
QRS DURATION: 62 MS
QTC CALCULATION (BEZET): 453 MS
R AXIS: 31 DEGREES
RBC # BLD AUTO: 3.4 X10(6)UL
SODIUM SERPL-SCNC: 137 MMOL/L (ref 136–145)
T AXIS: 74 DEGREES
VENTRICULAR RATE: 102 BPM
WBC # BLD AUTO: 17.8 X10(3) UL (ref 4–11)

## 2022-05-26 PROCEDURE — 93010 ELECTROCARDIOGRAM REPORT: CPT | Performed by: INTERNAL MEDICINE

## 2022-05-26 PROCEDURE — 85025 COMPLETE CBC W/AUTO DIFF WBC: CPT | Performed by: STUDENT IN AN ORGANIZED HEALTH CARE EDUCATION/TRAINING PROGRAM

## 2022-05-26 PROCEDURE — 80048 BASIC METABOLIC PNL TOTAL CA: CPT | Performed by: STUDENT IN AN ORGANIZED HEALTH CARE EDUCATION/TRAINING PROGRAM

## 2022-05-26 PROCEDURE — 93005 ELECTROCARDIOGRAM TRACING: CPT

## 2022-05-26 PROCEDURE — 71045 X-RAY EXAM CHEST 1 VIEW: CPT | Performed by: INTERNAL MEDICINE

## 2022-05-26 RX ORDER — HYDRALAZINE HYDROCHLORIDE 20 MG/ML
10 INJECTION INTRAMUSCULAR; INTRAVENOUS EVERY 6 HOURS PRN
Status: DISCONTINUED | OUTPATIENT
Start: 2022-05-26 | End: 2022-05-26

## 2022-05-26 RX ORDER — NITROFURANTOIN 25; 75 MG/1; MG/1
100 CAPSULE ORAL 2 TIMES DAILY
Qty: 14 CAPSULE | Refills: 0 | Status: SHIPPED | OUTPATIENT
Start: 2022-05-26 | End: 2022-06-02

## 2022-05-26 RX ORDER — HYDROCODONE BITARTRATE AND ACETAMINOPHEN 10; 325 MG/1; MG/1
2 TABLET ORAL EVERY 4 HOURS PRN
Qty: 30 TABLET | Refills: 0 | Status: SHIPPED | OUTPATIENT
Start: 2022-05-26

## 2022-05-26 NOTE — PROGRESS NOTES
NURSING DISCHARGE NOTE    Discharged Home via Wheelchair. Accompanied by Spouse  Belongings Taken by patient/family. Patient given discharge instructions. Instructed to go to scheduled follow-up appointments and  e-prescribed meds at listed pharmacy. Given printed norco prescription at discharge. IVs removed and intact.

## 2022-05-26 NOTE — PLAN OF CARE
Upon assessment pt is a&o x4, VSS and afebrile. Pt denies calf pain, chest pain and NOEL. RA, . Tele- NSR. Pt tolerating diet, denies n/v. Voids. Up with SBA and walker. Morphine given for pain per [facility] with adequate relief. IV fluids infusing to L hand. Nephrostomy tube in place c/d/I. Pt resting in bed with call light within reach and safety precautions in place. Will continue to monitor.      Problem: Patient/Family Goals  Goal: Patient/Family Long Term Goal  Description: Patient's Long Term Goal: Discharge home    Interventions:  - IVF  -Tolerate diet  -Return to daily activities  - See additional Care Plan goals for specific interventions  Outcome: Progressing  Goal: Patient/Family Short Term Goal  Description: Patient's Short Term Goal: Pain management    Interventions:   - Pain assessment  Pain meds as needed  - See additional Care Plan goals for specific interventions  Outcome: Progressing     Problem: SAFETY ADULT - FALL  Goal: Free from fall injury  Description: INTERVENTIONS AS NEEDED:  - Assess patient for physical needs  - Identify cognitive and physical deficits and behaviors that affect risk of falls  - [place] fall precautions as indicated by assessment  - Educate patient/family on patient safety including physical imitations  - Instruct patient to call for assistance with activity based on assessment  - Modify environment to reduce risk of injury  - Provide assistive devices as appropriate  - Consider occupational and/or physical therapy consult to assist with strengthening/mobility  - Encourage toileting schedule    - Evaluate and order additional treatment as indicated  Outcome: Progressing     Problem: RISK FOR INFECTION - ADULT  Goal: Absence of fever/infection during anticipated neutropenic period  Description: INTERVENTIONS  - Monitor WBC  - Administer growth factors as ordered  - Implement neutropenic guidelines  Outcome: Progressing

## 2022-05-26 NOTE — PLAN OF CARE
Problem: Patient/Family Goals  Goal: Patient/Family Long Term Goal  Description: Patient's Long Term Goal: Discharge home    Interventions:  - IVF  -Tolerate diet  -Return to daily activities  - See additional Care Plan goals for specific interventions  Outcome: Progressing  Goal: Patient/Family Short Term Goal  Description: Patient's Short Term Goal: Pain management    Interventions:   - Pain assessment  Pain meds as needed  - See additional Care Plan goals for specific interventions  Outcome: Progressing     Problem: SAFETY ADULT - FALL  Goal: Free from fall injury  Description: INTERVENTIONS AS NEEDED:  - Assess patient for physical needs  - Identify cognitive and physical deficits and behaviors that affect risk of falls  - [place] fall precautions as indicated by assessment  - Educate patient/family on patient safety including physical imitations  - Instruct patient to call for assistance with activity based on assessment  - Modify environment to reduce risk of injury  - Provide assistive devices as appropriate  - Consider occupational and/or physical therapy consult to assist with strengthening/mobility  - Encourage toileting schedule  - Responsible Professional: RN    - Evaluate and order additional treatment as indicated  - Responsible Professional: MD  Outcome: Progressing     Problem: RISK FOR INFECTION - ADULT  Goal: Absence of fever/infection during anticipated neutropenic period  Description: INTERVENTIONS  - Monitor WBC  - Administer growth factors as ordered  - Implement neutropenic guidelines  Outcome: Progressing

## 2022-05-26 NOTE — CM/SW NOTE
05/26/22 0900   CM/SW Referral Data   Referral Source Social Work (self-referral)   Reason for Referral Discharge [address]   Patient lives with Spouse/Significant other   Patient Status Prior to Admission   Services in place prior to admission [address] Provider Info [facility]   Discharge Needs   Anticipated D/C needs Home health care     Patient is a 68 y/o female who admitted with Cystoscopy. Patient is current with [facility] RN, KRISH order placed. SW will remain available.      COREEN Rosenthal  Discharge Planner  [phone]

## 2022-05-27 ENCOUNTER — TELEPHONE (OUTPATIENT)
Dept: UROLOGY | Facility: CLINIC | Age: 72
End: 2022-05-27

## 2022-05-27 RX ORDER — MIRABEGRON 50 MG/1
50 TABLET, FILM COATED, EXTENDED RELEASE ORAL DAILY
Qty: 90 TABLET | Refills: 3 | Status: SHIPPED | OUTPATIENT
Start: 2022-05-27 | End: 2022-06-26

## 2022-05-27 NOTE — TELEPHONE ENCOUNTER
Pt phoned JONAS taveras saying she needed a refill of her Myrbetriq 50mg that Dr Dinorah Tellez had ordered previously. TORB Dr Hollie Calderon to refill.

## 2022-05-29 LAB — CALCULI MASS: 838 MG

## 2022-10-09 NOTE — PLAN OF CARE
Problem: Diabetes/Glucose Control  Goal: Glucose maintained within prescribed range  Description: INTERVENTIONS:  - Monitor Blood Glucose as ordered  - Assess for signs and symptoms of hyperglycemia and hypoglycemia  - Administer ordered medications to m Use the 5 A's (Ask, Advise, Assess, Assist, Arrange)

## 2023-03-02 ENCOUNTER — TELEPHONE (OUTPATIENT)
Dept: UROLOGY | Facility: CLINIC | Age: 73
End: 2023-03-02

## 2023-03-02 NOTE — TELEPHONE ENCOUNTER
TC to pt to inform her that vaginal estradiol cream rx refilled thru [facility] per request. Also informed pt that she is overdue for follow up w/. She is currently sick and will call office next week to schedule follow up.

## 2023-04-23 NOTE — DISCHARGE SUMMARY
Care Due:                  Date            Visit Type   Department     Provider  --------------------------------------------------------------------------------                                EP -                              PRIMARY      MET INTERNAL  Last Visit: 03-      CARE ([facility])   MEDICINE       [place]  Next Visit: None Scheduled  None         None Found                                                            Last  Test          Frequency    Reason                     Performed    Due Date  --------------------------------------------------------------------------------    Office Visit  12 months..  atorvastatin, valsartan..  03- 03-    CMP.........  12 months..  atorvastatin, valsartan..  06- 06-    Lipid Panel.  12 months..  atorvastatin.............  06- 06-    [employer] Embedded Care Gaps. Reference number: [id number]. 4/23/2023   6:13:36 AM CDT   General Medicine Discharge Summary     Patient ID:  Reina Casas [facility]  76year old  1/6/1950    Admit date: 9/14/2018    Discharge date and time: 9/16/18    Attending Physician: Eddie Scanlon MD     Primary C TO CASE MANAGEMENT  IP CONSULT TO SOCIAL WORK    Radiology reports:    Xr Chest Pa + Lat Chest (cpt=71046)    Result Date: 9/14/2018  PROCEDURE:  XR CHEST PA + LAT CHEST (CPT=71046)  INDICATIONS:  Wheezing  COMPARISON:  [facility]WARD , XR CHEST AP PORTABLE  (CPT= of the femoral head. Arleene Mass  MD LEANNE HardinW/Jose Gomez PT NAME: Denys Castillo MRN: [id number] DD: 08/25/2018 08:13 TD: 08/25/2018 08:46 Job #: [id number]    Xr Hip W Or Wo Pelvis 1 View, Left (cpt=73501)    Result Date: 9/14/2018  PROCEDURE:  XR HIP W OR WO DAYS    Oxybutynin Chloride ER 10 MG Oral Tablet 24 Hr  Take 10 mg by mouth once daily. Fluticasone Propionate 50 MCG/ACT Nasal Suspension  2 sprays by Each Nare route daily as needed for Rhinitis.     SALINE MIST SPRAY NA  1 spray by Nasal route as ne

## 2023-04-25 ENCOUNTER — OFFICE VISIT (OUTPATIENT)
Dept: UROLOGY | Facility: CLINIC | Age: 73
End: 2023-04-25
Attending: OBSTETRICS & GYNECOLOGY
Payer: MEDICARE

## 2023-04-25 ENCOUNTER — TELEPHONE (OUTPATIENT)
Dept: UROLOGY | Facility: CLINIC | Age: 73
End: 2023-04-25

## 2023-04-25 VITALS — BODY MASS INDEX: 29.35 KG/M2 | WEIGHT: 187 LBS | HEIGHT: 67 IN | TEMPERATURE: 98 F

## 2023-04-25 DIAGNOSIS — Z98.890 POST-OPERATIVE STATE: ICD-10-CM

## 2023-04-25 DIAGNOSIS — N81.84 PELVIC MUSCLE WASTING: ICD-10-CM

## 2023-04-25 DIAGNOSIS — R35.1 NOCTURIA: Primary | ICD-10-CM

## 2023-04-25 DIAGNOSIS — N95.2 POSTMENOPAUSAL ATROPHIC VAGINITIS: ICD-10-CM

## 2023-04-25 DIAGNOSIS — N39.0 FREQUENT UTI: ICD-10-CM

## 2023-04-25 DIAGNOSIS — N39.41 URGE URINARY INCONTINENCE: ICD-10-CM

## 2023-04-25 PROCEDURE — 51701 INSERT BLADDER CATHETER: CPT

## 2023-04-25 PROCEDURE — 87086 URINE CULTURE/COLONY COUNT: CPT | Performed by: OBSTETRICS & GYNECOLOGY

## 2023-04-25 PROCEDURE — 99212 OFFICE O/P EST SF 10 MIN: CPT

## 2023-04-25 RX ORDER — VIBEGRON 75 MG/1
75 TABLET, FILM COATED ORAL DAILY
Qty: 30 TABLET | Refills: 3 | Status: SHIPPED | OUTPATIENT
Start: 2023-04-25

## 2023-04-25 RX ORDER — TROSPIUM CHLORIDE ER 60 MG/1
60 CAPSULE ORAL DAILY
Qty: 90 CAPSULE | Refills: 3 | Status: SHIPPED | OUTPATIENT
Start: 2023-04-25 | End: 2023-04-25

## 2023-04-25 NOTE — TELEPHONE ENCOUNTER
TC from pt saying Trospium ER 60mg was not covered by her insurance. Wash Sensor Dr Adam Le 75mg daily. Advised pt to talk to pharmacy and see if we can put in for a PA or tier exception if Gemtesa isn't covered. Pt verbalized an understanding and agrees w/ plan. All questions answered.
respiratory services

## 2023-05-02 NOTE — CM/SW NOTE
09/16/18 1100   CM/SW Referral Data   Referral Source Nurse   Reason for Referral Discharge planning;Psychoscial assessment   Informant Patient   Patient Status Prior to Admission   Independent with ADLs and Mobility Yes   Services in place prior to adm tea

## 2023-06-05 ENCOUNTER — VIRTUAL PHONE E/M (OUTPATIENT)
Dept: UROLOGY | Facility: CLINIC | Age: 73
End: 2023-06-05
Attending: OBSTETRICS & GYNECOLOGY
Payer: MEDICARE

## 2023-06-05 DIAGNOSIS — N39.41 URGE URINARY INCONTINENCE: ICD-10-CM

## 2023-06-05 DIAGNOSIS — R35.1 NOCTURIA: Primary | ICD-10-CM

## 2023-12-21 ENCOUNTER — TELEPHONE (OUTPATIENT)
Dept: UROLOGY | Facility: CLINIC | Age: 73
End: 2023-12-21

## 2023-12-21 NOTE — TELEPHONE ENCOUNTER
Pt calling to request the names of other OAB meds she can try. States that Myrbetriq and Yoselin Chang are not covered by her insurance and she wants to know what else she can try for nocturia and UUI. Has taken Myrbetriq in past, worked well but coverage ended and too costly, so she stopped. Last visit was phone visit with Dr. Kerns Shells 6-5-23, plan as follows:  Vag estrogen twice weekly  UUI - bladder diet/drill, cont trospium XR 60mg daily  Nocturia - cont trospium XR 60mg daily  Reviewed bowel management  Discussed daily pelvic exercises   RTC in one year for post op exam  Call with s/sx of UTI    Currently using Estrace as directed  Denies taking Trospium. States even with prior authorization, the meds were too costly. Never done PFPT. Currently has a broken leg.   Sent to appt desk for PA visit for UUI/nocturia

## 2024-01-25 ENCOUNTER — OFFICE VISIT (OUTPATIENT)
Dept: UROLOGY | Facility: CLINIC | Age: 74
End: 2024-01-25
Attending: OBSTETRICS & GYNECOLOGY
Payer: MEDICARE

## 2024-01-25 VITALS — BODY MASS INDEX: 29.98 KG/M2 | HEIGHT: 67 IN | WEIGHT: 191 LBS

## 2024-01-25 DIAGNOSIS — N39.41 URGE INCONTINENCE: ICD-10-CM

## 2024-01-25 DIAGNOSIS — R35.1 NOCTURIA: Primary | ICD-10-CM

## 2024-01-25 DIAGNOSIS — N95.2 POSTMENOPAUSAL ATROPHIC VAGINITIS: ICD-10-CM

## 2024-01-25 DIAGNOSIS — N81.84 PELVIC MUSCLE WASTING: ICD-10-CM

## 2024-01-25 PROCEDURE — 99212 OFFICE O/P EST SF 10 MIN: CPT

## 2024-01-25 RX ORDER — MIRABEGRON 50 MG/1
50 TABLET, FILM COATED, EXTENDED RELEASE ORAL DAILY
Qty: 90 TABLET | Refills: 3 | Status: SHIPPED | OUTPATIENT
Start: 2024-01-25

## 2024-01-25 NOTE — PROGRESS NOTES
Patient presents to follow up UUI    She is currently using estrace twice a week  Was taking Myrbetriq last summer - too expensive, improvement with symptoms, prefers to go back to Myrbetriq  Gemtesa too expensive  Trospium too expensive  Bowels constipated, takes fiber daily, miralax as needed, BM 2x/week  Denies any s/sx of UTI      UDS 2020  No incomplete bladder emptying  No DO    Urogynecology Summary:  Urogynecology Summary  Prolapse: No  MICHAEL: Yes  Urge Incontinence: Yes  Nocturia Frequency: Greater than 4 (6)  Frequency: 2 - 3 hours  Incomplete emptying: No  Constipation: No (states no, but only has BM 2x/week)  Wears Pad Night?: 1 (heavy diaper)  Activities are limited by UI/POP?: Yes  Currently Sexually Active: No    Ht 67\"   Wt 191 lb (86.6 kg)   BMI 29.91 kg/m²     Gen: NAD  CV:RRR  Pulm:nl effort  Abd: soft    Impression/Plan:    ICD-10-CM    1. Nocturia  R35.1 Mirabegron ER (MYRBETRIQ) 50 MG Oral Tablet 24 Hr      2. Urge incontinence  N39.41 Mirabegron ER (MYRBETRIQ) 50 MG Oral Tablet 24 Hr      3. Postmenopausal atrophic vaginitis  N95.2       4. Pelvic muscle wasting  N81.84           Discussion Items:   Discussed dietary and behavioral modifications for mgmt of urinary symptoms  Discussed weight management and benefits of weight loss on urinary symptoms  Reviewed AUA/SUFU guidelines on mgmt of non-neurogenic OAB  Discussed pharmacologic and nonpharmacologic mgmt options of urinary symptoms - reviewed risks, benefits, alternatives, and goals of treatment  Discussed specific risks related to OAB meds including, but not limited to dry mouth, constipation, blurry vision, cognitive changes, and BP elevation.    Treatment Plan, Non-surgical:   Order placed for Myrbetriq - call if not affordable, recommend going online to see if patient qualifies for discount program  Continue vaginal estrogen cream twice weekly  Bowel regimen - recommend daily fiber and miralax  Bladder diet/drill  Call with s/sx of  UTI  All questions answered  She understands and agrees to plan    Return in about 6 weeks (around 3/7/2024) for UUI follow up, sooner prn, yearly post op in April with Dr. Richardson .    Sonia Crump PA-C

## 2024-03-07 ENCOUNTER — VIRTUAL PHONE E/M (OUTPATIENT)
Dept: UROLOGY | Facility: CLINIC | Age: 74
End: 2024-03-07
Attending: OBSTETRICS & GYNECOLOGY
Payer: MEDICARE

## 2024-03-07 ENCOUNTER — TELEPHONE (OUTPATIENT)
Dept: UROLOGY | Facility: CLINIC | Age: 74
End: 2024-03-07

## 2024-03-07 DIAGNOSIS — N95.2 POSTMENOPAUSAL ATROPHIC VAGINITIS: ICD-10-CM

## 2024-03-07 DIAGNOSIS — N39.41 URGE INCONTINENCE: ICD-10-CM

## 2024-03-07 DIAGNOSIS — N81.84 PELVIC MUSCLE WASTING: ICD-10-CM

## 2024-03-07 DIAGNOSIS — R35.1 NOCTURIA: Primary | ICD-10-CM

## 2024-03-07 RX ORDER — TROSPIUM CHLORIDE ER 60 MG/1
60 CAPSULE ORAL DAILY
Qty: 90 CAPSULE | Refills: 3 | Status: SHIPPED | OUTPATIENT
Start: 2024-03-07

## 2024-03-07 NOTE — PROGRESS NOTES
Patient to follow up UUI  Given circumstances surrounding COVID-19 this visit is being conducted as a televisit with pt's consent.  Pt in safe, private location prior to beginning visit. Provider located in office setting.    Using vaginal estrogen cream twice a week   Bowels loose, couple BM everyday   Denies s/sx of UTI    S/P Anterior and Posterior Repair;Mid-urethral Sling;Cystoscopy 11/23/20  UDS 2020  No incomplete bladder emptying  No DO    Has tried:  Oxybutynin   Trospium XR 60 mg - 50% improvement, dry mouth   Myrbetriq 50 mg - too expensive $600  Gemtesa - too expensive     Urogynecology Summary:       There were no vitals taken for this visit.    Impression/Plan:    ICD-10-CM    1. Nocturia  R35.1 Trospium Chloride ER 60 MG Oral Capsule SR 24 Hr      2. Urge incontinence  N39.41 Trospium Chloride ER 60 MG Oral Capsule SR 24 Hr      3. Postmenopausal atrophic vaginitis  N95.2       4. Pelvic muscle wasting  N81.84           Discussion Items:   Discussed dietary and behavioral modifications for mgmt of urinary symptoms  Discussed weight management and benefits of weight loss on urinary symptoms  Reviewed AUA/SUFU guidelines on mgmt of non-neurogenic OAB  Discussed pharmacologic and nonpharmacologic mgmt options of urinary symptoms - reviewed risks, benefits, alternatives, and goals of treatment  Discussed specific risks related to OAB meds including, but not limited to dry mouth, constipation, blurry vision, cognitive changes, and BP elevation.    Treatment Plan, Non-surgical:   Start Trospium XR 60 mg daily  Continue vaginal estrogen cream twice weekly  Bladder diet/drill  Bowel regimen reviewed  Call with s/sx of UTI  All questions answered  She understands and agrees to plan    Return in about 6 weeks (around 4/18/2024) for UUI, sooner prn .    Sonia Crump PA-C

## 2024-04-18 ENCOUNTER — VIRTUAL PHONE E/M (OUTPATIENT)
Dept: UROLOGY | Facility: CLINIC | Age: 74
End: 2024-04-18
Attending: OBSTETRICS & GYNECOLOGY
Payer: MEDICARE

## 2024-04-18 ENCOUNTER — TELEPHONE (OUTPATIENT)
Dept: UROLOGY | Facility: CLINIC | Age: 74
End: 2024-04-18

## 2024-04-18 DIAGNOSIS — N39.41 URGE INCONTINENCE: ICD-10-CM

## 2024-04-18 DIAGNOSIS — N95.2 POSTMENOPAUSAL ATROPHIC VAGINITIS: ICD-10-CM

## 2024-04-18 DIAGNOSIS — N81.84 PELVIC MUSCLE WASTING: ICD-10-CM

## 2024-04-18 DIAGNOSIS — R35.1 NOCTURIA: Primary | ICD-10-CM

## 2024-04-18 NOTE — PROGRESS NOTES
Patient to follow up UUI  Given circumstances surrounding COVID-19 this visit is being conducted as a televisit with pt's consent.  Pt in safe, private location prior to beginning visit. Provider located in office setting.    She is currently using Trospium XR 60 mg daily     She reports 70% improvement and reports no dry mouth or constipation  Denies other significant side effects   Still has urgency  Nocturia improved to 0-1  Using vaginal estrogen cream twice a week   Bowels regular, takes probiotic   Denies s/sx of UTI  Complaining of back pain, unsure if due to kidney stones or musculoskeletal, has hx of kidney stones in 2022, did not have pain with prior stones  Recent UA by PCP wnl  Happy      S/P Anterior and Posterior Repair;Mid-urethral Sling;Cystoscopy 11/23/20  UDS 2020  No incomplete bladder emptying  No DO     Has tried:  Oxybutynin   Trospium XR 60 mg - 50% improvement, dry mouth   Myrbetriq 50 mg - too expensive $600  Gemtesa - too expensive     Urogynecology Summary:       There were no vitals taken for this visit.    Impression/Plan:    ICD-10-CM    1. Nocturia  R35.1       2. Urge incontinence  N39.41       3. Postmenopausal atrophic vaginitis  N95.2       4. Pelvic muscle wasting  N81.84           Discussion Items:   Discussed dietary and behavioral modifications for mgmt of urinary symptoms  Discussed weight management and benefits of weight loss on urinary symptoms  Reviewed AUA/SUFU guidelines on mgmt of non-neurogenic OAB  Discussed pharmacologic and nonpharmacologic mgmt options of urinary symptoms - reviewed risks, benefits, alternatives, and goals of treatment  Discussed specific risks related to OAB meds including, but not limited to dry mouth, constipation, blurry vision, cognitive changes, and BP elevation.  Discussed mgmt of vulvovaginal atrophy with vaginal estrogen cream. Reviewed associated benefits, risks, alternatives, and goals. Recommend low dose twice weekly mgmt   Discussed  back pain, offered work up with UA and renal ultrasound but patient prefers to talk to Dr. Richardson next week     Treatment Plan, Non-surgical:   Continue Trospium XR 60 mg daily for UUI  Continue vaginal estrogen cream twice weekly  Bladder diet/drill  Bowel regimen reviewed  Call with s/sx of UTI  All questions answered  She understands and agrees to plan    Follow up annual post op next week with Dr. Richardson, sooner prodalys Crump PA-C

## 2024-04-18 NOTE — TELEPHONE ENCOUNTER
Spoke with patient and completed the check-in for today's telephone visit with Sonia. Also reminded patient of her yearly visit with Dr Richardson on 4/23/24.

## 2024-04-23 ENCOUNTER — OFFICE VISIT (OUTPATIENT)
Dept: UROLOGY | Facility: CLINIC | Age: 74
End: 2024-04-23
Attending: OBSTETRICS & GYNECOLOGY
Payer: MEDICARE

## 2024-04-23 VITALS
DIASTOLIC BLOOD PRESSURE: 72 MMHG | WEIGHT: 199.63 LBS | HEIGHT: 67 IN | SYSTOLIC BLOOD PRESSURE: 118 MMHG | BODY MASS INDEX: 31.33 KG/M2 | TEMPERATURE: 98 F

## 2024-04-23 DIAGNOSIS — R35.1 NOCTURIA: ICD-10-CM

## 2024-04-23 DIAGNOSIS — N39.41 URGE INCONTINENCE: ICD-10-CM

## 2024-04-23 DIAGNOSIS — Z98.890 POST-OPERATIVE STATE: ICD-10-CM

## 2024-04-23 DIAGNOSIS — N81.84 PELVIC MUSCLE WASTING: Primary | ICD-10-CM

## 2024-04-23 DIAGNOSIS — N95.2 POSTMENOPAUSAL ATROPHIC VAGINITIS: ICD-10-CM

## 2024-04-23 PROCEDURE — 99212 OFFICE O/P EST SF 10 MIN: CPT

## 2024-04-23 NOTE — PROGRESS NOTES
She is s/p Post-Op Summary  Procedure Date: 11/23/20  Procedure Name: Anterior and Posterior Repair;Mid-urethral Sling;Cystoscopy  Post-Op Symptoms: UUI  Do you feel your surgery was successful?: Very successful  Compared to before surgery are you?: Much better  If you could go back to before your surgery, would you do it all over again?: Maybe yes  How satisfied are you with the results of your surgery?: Completely satisfied    Doing well   she complains of UUI, taking trospium XR 60mg  Nocturia x3 (down from 6)  75% improvement  No SE from OAB meds  Vag estrogen twice weekly  Voids freely  No UTIs  BMs reg  No Pain  No bulge  Rare MICHAEL    /72   Temp 97.8 °F (36.6 °C)   Ht 67\"   Wt 199 lb 9.6 oz (90.5 kg)   BMI 31.26 kg/m²     Gen: NAD  CV:RRR  Pulm:nl effort  Abd: soft  : tolerated vaginal exam. Suture site well. No active bleeding. Good support    Discussed mgmt of vulvovaginal atrophy with vaginal estrogen cream. Reviewed associated benefits, risks, alternatives, and goals. Recommend low dose twice weekly mgmt   Vag estrogen twice weekly    Discussed dietary and behavioral modifications for mgmt of urinary symptoms  Discussed weight management and benefits of weight loss on urinary symptoms  Reviewed AUA/SUFU guidelines on mgmt of non-neurogenic OAB  Discussed pharmacologic and nonpharmacologic mgmt options of urinary symptoms - reviewed risks, benefits, alternatives, and goals of treatment  Discussed specific risks related to OAB meds including, but not limited to dry mouth, constipation, blurry vision, cognitive changes, and BP elevation  UUI - bladder diet/drill, cont trospium XR 60mg    Nocturia, improve sleep hygiene, cont trospium XR 60mg daily    Reviewed bowel management  Discussed daily pelvic exercises     RTC in one year for post op exam  Call with s/sx of UTI    All questions answered  She understands and agrees to plan    Arpita Richardson, DO

## 2024-04-23 NOTE — PATIENT INSTRUCTIONS
Each year, thousands of Americans fall at home.  Many of them are seriously injured, and some are disabled.  All age groups are affected, with adults over age 60 ranking highest for these injuries.    The points below address hazards found in your home that have been associated with falls.  Attention to these hazards now may prevent a fall in the future.      General  Keep pathways clear and free of clutter.  Remove throw rugs or use double-sided tape or a non-slip backing so the rugs won’t slip.  Coil or tape wires next to the wall to avoid tripping over them.  Keep objects off the stairs.  Be sure carpet on stairways is firmly attached.  Apply non-slip rubber treads to the stairs if there is no carpet.  Fix loose or uneven steps.  Fix loose handrails.  Maintain adequate lighting.    Kitchen  Keep things you use often on the lower shelves.  Use a sturdy step stool when climbing.  Do not use a chair.    Bathroom  Use a non-skid mat or adhesive strips in the bathtub or shower.  Install grab bars in the tub, shower and toilet area. Bedroom  Place a lamp close to the bed where it is easy to reach.  Use a night light so you can see where you’re walking.    Other  Exercise regularly if not contraindicated by your physician.  Exercise makes you stronger and improves your balance and coordination.  Review your medications with your Doctor.  Some medications can make you sleepy or dizzy.  Have your vision checked.  Poor vision can increase your risk of falling.  Ge up slowly after you sit or lie down.  Wear shoes both inside and outside the house.  Keep emergency numbers near each phone.  Consider wearing an alarm device that will bring help in case you fall and can’t get up.

## 2025-01-24 NOTE — PROGRESS NOTES
Caller: Luis Eduardo leonard [facility]     Doctor: CATA    Reason for call: xfer them to nurse to discuss pts script    Call back#:    She is s/p Post-Op Summary  Procedure Date: 11/23/20  Procedure Name: Anterior and Posterior Repair;Cystoscopy; Mid-urethral Sling  Post-Op Symptoms: UUI  Do you feel your surgery was successful?: Very successful  Compared to before surgery are you?: Much b

## 2025-03-12 ENCOUNTER — TELEPHONE (OUTPATIENT)
Dept: UROLOGY | Facility: CLINIC | Age: 75
End: 2025-03-12

## 2025-03-12 DIAGNOSIS — R35.1 NOCTURIA: ICD-10-CM

## 2025-03-12 DIAGNOSIS — N39.41 URGE INCONTINENCE: ICD-10-CM

## 2025-03-12 RX ORDER — TROSPIUM CHLORIDE ER 60 MG/1
60 CAPSULE ORAL DAILY
Qty: 90 CAPSULE | Refills: 3 | Status: SHIPPED | OUTPATIENT
Start: 2025-03-12

## 2025-04-17 ENCOUNTER — TELEPHONE (OUTPATIENT)
Dept: UROLOGY | Facility: CLINIC | Age: 75
End: 2025-04-17

## 2025-04-23 ENCOUNTER — TELEPHONE (OUTPATIENT)
Dept: UROLOGY | Facility: CLINIC | Age: 75
End: 2025-04-23

## 2025-04-29 ENCOUNTER — OFFICE VISIT (OUTPATIENT)
Dept: UROLOGY | Facility: CLINIC | Age: 75
End: 2025-04-29
Attending: OBSTETRICS & GYNECOLOGY
Payer: MEDICARE

## 2025-04-29 VITALS — WEIGHT: 194 LBS | BODY MASS INDEX: 30 KG/M2 | RESPIRATION RATE: 16 BRPM | TEMPERATURE: 98 F

## 2025-04-29 DIAGNOSIS — N81.84 PELVIC MUSCLE WASTING: Primary | ICD-10-CM

## 2025-04-29 DIAGNOSIS — R35.1 NOCTURIA: ICD-10-CM

## 2025-04-29 DIAGNOSIS — N39.41 URGE INCONTINENCE: ICD-10-CM

## 2025-04-29 DIAGNOSIS — Z98.890 POST-OPERATIVE STATE: ICD-10-CM

## 2025-04-29 DIAGNOSIS — N95.2 POSTMENOPAUSAL ATROPHIC VAGINITIS: ICD-10-CM

## 2025-04-29 PROCEDURE — 87086 URINE CULTURE/COLONY COUNT: CPT | Performed by: OBSTETRICS & GYNECOLOGY

## 2025-04-29 PROCEDURE — 99212 OFFICE O/P EST SF 10 MIN: CPT

## 2025-04-29 RX ORDER — ESTRADIOL 0.1 MG/G
CREAM VAGINAL
Qty: 1 EACH | Refills: 3 | Status: SHIPPED | OUTPATIENT
Start: 2025-04-29

## 2025-04-29 RX ORDER — TROSPIUM CHLORIDE ER 60 MG/1
60 CAPSULE ORAL DAILY
Qty: 90 CAPSULE | Refills: 3 | Status: SHIPPED | OUTPATIENT
Start: 2025-04-29

## 2025-04-29 NOTE — PROGRESS NOTES
She is s/p Post-Op Summary  Procedure Date: 11/23/20  Procedure Name: Anterior and Posterior Repair, Mid-urethral Sling, Cystoscopy  Do you feel your surgery was successful?: Very successful  Compared to before surgery are you?: Much better  If you could go back to before your surgery, would you do it all over again?: Definitely yes  How satisfied are you with the results of your surgery?: Completely satisfied    Doing well   Recent hospitalizations  She cont to c/o UUI  Trospium XR 60mg daily, sx improved  UUI persists  Nocturia x1-3    No worsening dry mouth or constipation  She has tried oxybutynin & myrbetriq (too expensive)     Voids freely  Some sense of incomplete bladder emptying at times  BMs constipated   No Pain. Not sexually active  Tolerates ambulation & diet  Does c/o some MICHAEL  No bulge sx  Interested in PFPT  Reg with vag estrogen    Kidney stone mgmt with urology    +DM  +htn  RA and osteoarthritis    Temp 97.8 °F (36.6 °C)   Resp 16   Wt 194 lb (88 kg)   BMI 30.38 kg/m²     Gen: NAD  CV:RRR  Pulm:nl effort  Abd: soft  : tolerated vaginal exam. Suture site well. No active bleeding. Good support  Unable to void in BR  Verbal consent obtained  Sterile technique used to empty bladder, specimen sent (<100cc urine)    Follow ucx, tx if +     Discussed mgmt of vulvovaginal atrophy with vaginal estrogen cream. Reviewed associated benefits, risks, alternatives, and goals. Recommend low dose twice weekly mgmt   Cont Vag estrogen twice weekly     Discussed dietary and behavioral modifications for mgmt of urinary symptoms  Discussed weight management and benefits of weight loss on urinary symptoms  Reviewed AUA/SUFU guidelines on mgmt of non-neurogenic OAB  Discussed pharmacologic and nonpharmacologic mgmt options of urinary symptoms - reviewed risks, benefits, alternatives, and goals of treatment  Discussed specific risks related to OAB meds including, but not limited to dry mouth, constipation, blurry  vision, cognitive changes, and BP elevation  UUI - bladder diet/drill, cont trospium XR 60mg  Add PFPT    Reviewed bowel management  Discussed daily pelvic exercises     RTC in one year for post op exam, sooner prn  Call with s/sx of UTI    All questions answered  She understands and agrees to plan    rApita Richardson DO

## (undated) DEVICE — SUT MONOCRYL 4-0 PS-2 Y496G

## (undated) DEVICE — SYRINGE 30ML LL TIP

## (undated) DEVICE — Device

## (undated) DEVICE — SUTURE VICRYL 2-0 FSL

## (undated) DEVICE — DRESSING AQUACEL AG 3.5 X 10

## (undated) DEVICE — PLUG IV MICROCLAVE 1IN MALE

## (undated) DEVICE — ZIPWIRE GUIDEWIRE .035X150 STR

## (undated) DEVICE — STOCKINETTE HYDROMED 8X6

## (undated) DEVICE — HIWIRE NITINOL CORE WIRE GUIDE ANGLED WITH HYDROPHILIC COATING STANDARD SHAFT AND 3CM FLEXIBLE TIP: Brand: HIWIRE

## (undated) DEVICE — STERILE POLYISOPRENE POWDER-FREE SURGICAL GLOVES: Brand: PROTEXIS

## (undated) DEVICE — MEDI-VAC NON-CONDUCTIVE SUCTION TUBING: Brand: CARDINAL HEALTH

## (undated) DEVICE — PERCUTANEOUS ACCESS NEEDLE

## (undated) DEVICE — 1010 S-DRAPE TOWEL DRAPE 10/BX: Brand: STERI-DRAPE™

## (undated) DEVICE — GLOVE SURG SENSICARE SZ 8-1/2

## (undated) DEVICE — KENDALL SCD EXPRESS SLEEVES, KNEE LENGTH, MEDIUM: Brand: KENDALL SCD

## (undated) DEVICE — SUTURE ETHIBOND 1 OS-6

## (undated) DEVICE — WRAP COOLING HIP W/ICE PILLOW

## (undated) DEVICE — SUTURE VICRYL 2-0 CT-1

## (undated) DEVICE — MLPD DISPOSABLE PAD (6' ROLL) 3 ROLLS: Brand: SCHAERER MEDICAL USA

## (undated) DEVICE — RETRACTOR LONE STAR STAYS LG

## (undated) DEVICE — SUTURE ETHIBOND 5 CCS

## (undated) DEVICE — TOWEL SURG OR 17X30IN BLUE

## (undated) DEVICE — REM POLYHESIVE ADULT PATIENT RETURN ELECTRODE: Brand: VALLEYLAB

## (undated) DEVICE — SOLUTION  .9 1000ML BTL

## (undated) DEVICE — TOWEL: OR BLU 80/CS: Brand: MEDICAL ACTION INDUSTRIES

## (undated) DEVICE — SOL H2O IV

## (undated) DEVICE — SOL H2O 3000ML IRRIG

## (undated) DEVICE — Device: Brand: STABLECUT®

## (undated) DEVICE — SHEET,DRAPE,70X100,STERILE: Brand: MEDLINE

## (undated) DEVICE — TUBING CYSTO

## (undated) DEVICE — TOTAL HIP CDS: Brand: MEDLINE INDUSTRIES, INC.

## (undated) DEVICE — 3M(TM) MEDIPORE(TM) +PAD SOFT CLOTH ADHESIVE WOUND DRESSING 3566: Brand: 3M™ MEDIPORE™

## (undated) DEVICE — 2C14 #2 PDO 45 X 45: Brand: 2C14 #2 PDO 45 X 45

## (undated) DEVICE — 3M™ STERI-DRAPE™ U-DRAPE 1015: Brand: STERI-DRAPE™

## (undated) DEVICE — SUTURE VICRYL 0 CP-1

## (undated) DEVICE — PIN STEINMAN SMOOTH 1/8 9

## (undated) DEVICE — PERCUTANEUOS NEPHROLOGY CDS: Brand: MEDLINE INDUSTRIES, INC.

## (undated) DEVICE — WATER STERILE AQUALITE 1000ML

## (undated) DEVICE — BAG DRAIN INFECTION CNTRL 2000

## (undated) DEVICE — DILATOR/SHEATH SET: Brand: 8/10 DILATOR/SHEATH SET

## (undated) DEVICE — COVER,MAYO STAND,STERILE: Brand: MEDLINE

## (undated) DEVICE — SOL  .9 3000ML

## (undated) DEVICE — TIGERTAIL 5F FLXTIP 70CM

## (undated) DEVICE — SEAL Y BIOPSY PORT P6R SCOPE

## (undated) DEVICE — CAUTERY PENCIL

## (undated) DEVICE — NITINOL STONE RETRIEVAL BASKET: Brand: ZERO TIP

## (undated) DEVICE — ABDOMINAL PAD: Brand: DERMACEA

## (undated) DEVICE — SUTURE VICRYL 0 CT-1

## (undated) DEVICE — GAUZE SPONGES,12 PLY: Brand: CURITY

## (undated) DEVICE — SOL  .9 1000ML BTL

## (undated) DEVICE — COVER,BOOT,FOAM,NON-SKID,HOOK-LOOP,XLG: Brand: MEDLINE INDUSTRIES, INC.

## (undated) DEVICE — #15 STERILE STAINLESS BLADE: Brand: STERILE STAINLESS BLADES

## (undated) DEVICE — BIT DRL 30MM 3.2MM RNGLC ACTB

## (undated) DEVICE — CHLORAPREP ORANGE TINT 10.5ML

## (undated) DEVICE — GLV RAD SENSICARE BLACK 7.5

## (undated) DEVICE — GYN CDS: Brand: MEDLINE INDUSTRIES, INC.

## (undated) DEVICE — CATH FOLEY COUNCIL 16FR 5CC

## (undated) DEVICE — STERILE POLYISOPRENE POWDER-FREE SURGICAL GLOVES WITH EMOLLIENT COATING: Brand: PROTEXIS

## (undated) DEVICE — DERMABOND LIQUID ADHESIVE

## (undated) DEVICE — RIGID PNEUMATIC PROBE: Brand: RIGID PNEUMATIC PROBE

## (undated) DEVICE — VIOLET BRAIDED (POLYGLACTIN 910), SYNTHETIC ABSORBABLE SUTURE: Brand: COATED VICRYL

## (undated) DEVICE — PREMIUM WET SKIN PREP TRAY: Brand: MEDLINE INDUSTRIES, INC.

## (undated) DEVICE — SLEEVE KENDALL SCD EXPRESS MED

## (undated) DEVICE — SUT SILK 0 FSL 678G

## (undated) DEVICE — SPONGE STICK WITH PVP-I: Brand: KENDALL

## (undated) DEVICE — CATH FOLEY COUNCIL 18FR 5CC

## (undated) DEVICE — DECANTER BAG 9": Brand: MEDLINE INDUSTRIES, INC.

## (undated) DEVICE — TRAY FOLEY 16F IC URINMETER

## (undated) DEVICE — 3M™ TEGADERM™ TRANSPARENT FILM DRESSING, 1626W, 4 IN X 4-3/4 IN (10 CM X 12 CM), 50 EACH/CARTON, 4 CARTON/CASE: Brand: 3M™ TEGADERM™

## (undated) DEVICE — HANDLE LIGHT ECONOMY

## (undated) DEVICE — SOLUTION  .9 3000ML

## (undated) DEVICE — ZIPWIRE GUIDE .035X150 STR/STF

## (undated) DEVICE — 3M(TM) TEGADERM(TM) TRANSPARENT FILM DRESSING FRAME STYLE 9505W: Brand: 3M™ TEGADERM™

## (undated) DEVICE — ZIPWIRE GUIDEWIRE .038X150 ANG

## (undated) DEVICE — STOCK ORTH TUB 72X8IN NLTX

## (undated) DEVICE — 450 ML BOTTLE OF 0.05% CHLORHEXIDINE GLUCONATE IN 99.95% STERILE WATER FOR IRRIGATION, USP AND APPLICATOR.: Brand: IRRISEPT ANTIMICROBIAL WOUND LAVAGE

## (undated) DEVICE — APPLICATOR CHLORAPREP 26ML

## (undated) DEVICE — LIGHT HANDLE

## (undated) DEVICE — SPECIMEN CONTAINER,POSITIVE SEAL INDICATOR, OR PACKAGED: Brand: PRECISION

## (undated) DEVICE — DRAIN SPONGES,6 PLY: Brand: EXCILON

## (undated) DEVICE — STANDARD HYPODERMIC NEEDLE,POLYPROPYLENE HUB: Brand: MONOJECT

## (undated) DEVICE — GOWN,SIRUS,FABRIC-REINFORCED,LARGE: Brand: MEDLINE

## (undated) DEVICE — HOOD, PEEL-AWAY: Brand: FLYTE

## (undated) NOTE — LETTER
[address]  [id number]  [place] [id number]  [facility]: [phone]  FAX: [phone]   Consent to Procedure/Sedation    Date: __10/14/2020_____    Time: ___10:49 AM ___    1.  I authorize the performan ___________________________    Signature of person authorized to consent for patient: Relationship to patient:  ___________________________    ___________________    Witness: ____[facility]________________  Date: ___10/14/2020 8 45___________    Pr

## (undated) NOTE — Clinical Note
[employer] - I saw Adrienne Muller today with mixed UI and cystocele. I've recommended bladder testing to assess void with pessary trial. I will work to manage her sx. I appreciate the opportunity to participate in her care.  Thanks, Yola Stein

## (undated) NOTE — ED AVS SNAPSHOT
[facility] Emergency Department  [address] [address]  Phone:  [phone]  Fax:  [phone]   MRN: [id number]    Department:  [facility] Emergency Department   Date of Visit:  7/25/2017 CARE PHYSICIAN AT ONCE OR RETURN IMMEDIATELY TO THE EMERGENCY DEPARTMENT.     If you have been prescribed any medication(s), please fill your prescription right away and begin taking the medication(s) as directed    If the emergency physician has read X-ray

## (undated) NOTE — Clinical Note
Sally Done - I saw Kelby Roems today with recent UTIs. I've started nightly suppression in addition to twice weekly vag estrogen two help mitigate her UTI risk.  Thanks, Tammy Curiel

## (undated) NOTE — LETTER
Last Revised 02/07/06  Obstructive Sleep Apnea Questionnaire    Clinical signs and symptoms suggesting the possibility of HENRI    1. Predisposing physical characteristics (positive with any of the following present)  ? BMI 35kg/m²  ?  Craniofacial abnormalit pauses which are frightening to the observer, patient regularly falls asleep within minutes after being left unstimulated) in which case they should be treated as though they have severe sleep apnea.     The sleep laboratory’s assessment (none, mild, modera C. Requirement for postoperative opioids.                Opioid requirement             Points   None 0    Low dose oral opiod 1    High dose oral opioids, parenteral or neuraxial opiods 3      Point Total for C        Estimation of Perioperative Ris

## (undated) NOTE — LETTER
[facility]  [address] [facility], [address] [place] [address]    Consent for Operation    Date: __________________    Time: _______________    1.  I authorize the performance upon [address] the following operation:    Procedure(s):  RIGHT TOTAL HIP ARTHROP videotape. [facility] will not be responsible for storage or maintenance of this tape. 6. For the purpose of advancing medical education, I consent to the admittance of observers to the Operating Room.     7. I authorize the use of any specimen, organs Signature of Patient:   ___________________________    When the patient is a minor or mentally incompetent to give consent:  Signature of person authorized to consent for patient: ___________________________   Relationship to patient: _____________________ these medicines may increase my risk of anesthetic complications. · If I am allergic to anything or have had a reaction to anesthesia before. 3. I understand how the anesthesia medicine will help me (benefits).     4. I understand that with any type of patient’s representative) and answered their questions. The patient or their representative has agreed to have anesthesia services.     _____________________________________________________________________________  Witness        Date   Time  I have arturo